# Patient Record
Sex: FEMALE | Race: BLACK OR AFRICAN AMERICAN | NOT HISPANIC OR LATINO | Employment: FULL TIME | URBAN - METROPOLITAN AREA
[De-identification: names, ages, dates, MRNs, and addresses within clinical notes are randomized per-mention and may not be internally consistent; named-entity substitution may affect disease eponyms.]

---

## 2018-11-27 ENCOUNTER — HOSPITAL ENCOUNTER (INPATIENT)
Facility: HOSPITAL | Age: 33
LOS: 1 days | Discharge: HOME | DRG: 054 | End: 2018-11-28
Attending: EMERGENCY MEDICINE | Admitting: NEUROLOGICAL SURGERY
Payer: MEDICAID

## 2018-11-27 DIAGNOSIS — D32.9 MENINGIOMA: ICD-10-CM

## 2018-11-27 DIAGNOSIS — G93.6 CEREBRAL EDEMA: ICD-10-CM

## 2018-11-27 DIAGNOSIS — H53.40 VISUAL FIELD CUT: ICD-10-CM

## 2018-11-27 DIAGNOSIS — H53.8 BLURRY VISION: ICD-10-CM

## 2018-11-27 DIAGNOSIS — E23.6 PITUITARY MASS: Primary | ICD-10-CM

## 2018-11-27 DIAGNOSIS — H54.61 DECREASED VISION OF RIGHT EYE: ICD-10-CM

## 2018-11-27 DIAGNOSIS — Z01.810 PREOP CARDIOVASCULAR EXAM: ICD-10-CM

## 2018-11-27 PROBLEM — H47.291: Status: ACTIVE | Noted: 2018-11-27

## 2018-11-27 LAB
ALBUMIN SERPL BCP-MCNC: 3.5 G/DL
ALP SERPL-CCNC: 85 U/L
ALT SERPL W/O P-5'-P-CCNC: 6 U/L
ANION GAP SERPL CALC-SCNC: 8 MMOL/L
APTT BLDCRRT: 21.9 SEC
AST SERPL-CCNC: 13 U/L
B-HCG UR QL: NEGATIVE
BASOPHILS # BLD AUTO: 0.04 K/UL
BASOPHILS NFR BLD: 0.5 %
BILIRUB SERPL-MCNC: 0.4 MG/DL
BUN SERPL-MCNC: 9 MG/DL
CALCIUM SERPL-MCNC: 9.1 MG/DL
CHLORIDE SERPL-SCNC: 110 MMOL/L
CO2 SERPL-SCNC: 20 MMOL/L
CORTIS SERPL-MCNC: 11.6 UG/DL
CREAT SERPL-MCNC: 0.7 MG/DL
CTP QC/QA: YES
DIFFERENTIAL METHOD: ABNORMAL
EOSINOPHIL # BLD AUTO: 0.1 K/UL
EOSINOPHIL NFR BLD: 1.2 %
ERYTHROCYTE [DISTWIDTH] IN BLOOD BY AUTOMATED COUNT: 15.4 %
EST. GFR  (AFRICAN AMERICAN): >60 ML/MIN/1.73 M^2
EST. GFR  (NON AFRICAN AMERICAN): >60 ML/MIN/1.73 M^2
ESTRADIOL SERPL-MCNC: 74 PG/ML
FSH SERPL-ACNC: 7.9 MIU/ML
GLUCOSE SERPL-MCNC: 99 MG/DL
HCT VFR BLD AUTO: 32 %
HGB BLD-MCNC: 9.9 G/DL
IMM GRANULOCYTES # BLD AUTO: 0.02 K/UL
IMM GRANULOCYTES NFR BLD AUTO: 0.3 %
INR PPP: 1
LH SERPL-ACNC: 2.5 MIU/ML
LYMPHOCYTES # BLD AUTO: 1.8 K/UL
LYMPHOCYTES NFR BLD: 23.8 %
MCH RBC QN AUTO: 24.9 PG
MCHC RBC AUTO-ENTMCNC: 30.9 G/DL
MCV RBC AUTO: 80 FL
MONOCYTES # BLD AUTO: 0.4 K/UL
MONOCYTES NFR BLD: 4.8 %
NEUTROPHILS # BLD AUTO: 5.2 K/UL
NEUTROPHILS NFR BLD: 69.4 %
NRBC BLD-RTO: 0 /100 WBC
PLATELET # BLD AUTO: 388 K/UL
PMV BLD AUTO: 11 FL
POTASSIUM SERPL-SCNC: 3.7 MMOL/L
PROLACTIN SERPL IA-MCNC: 68.9 NG/ML
PROT SERPL-MCNC: 6.9 G/DL
PROTHROMBIN TIME: 10.5 SEC
RBC # BLD AUTO: 3.98 M/UL
SODIUM SERPL-SCNC: 138 MMOL/L
T3 SERPL-MCNC: 110 NG/DL
T3FREE SERPL-MCNC: 3.7 PG/ML
T4 FREE SERPL-MCNC: 1.19 NG/DL
T4 SERPL-MCNC: 8.6 UG/DL
TESTOST SERPL-MCNC: 20 NG/DL
TSH SERPL DL<=0.005 MIU/L-ACNC: 3.13 UIU/ML
WBC # BLD AUTO: 7.51 K/UL

## 2018-11-27 PROCEDURE — 83001 ASSAY OF GONADOTROPIN (FSH): CPT

## 2018-11-27 PROCEDURE — 84480 ASSAY TRIIODOTHYRONINE (T3): CPT

## 2018-11-27 PROCEDURE — 93010 ELECTROCARDIOGRAM REPORT: CPT | Mod: ,,, | Performed by: INTERNAL MEDICINE

## 2018-11-27 PROCEDURE — 82533 TOTAL CORTISOL: CPT

## 2018-11-27 PROCEDURE — 12000002 HC ACUTE/MED SURGE SEMI-PRIVATE ROOM

## 2018-11-27 PROCEDURE — 85610 PROTHROMBIN TIME: CPT

## 2018-11-27 PROCEDURE — 83003 ASSAY GROWTH HORMONE (HGH): CPT

## 2018-11-27 PROCEDURE — 81025 URINE PREGNANCY TEST: CPT | Performed by: EMERGENCY MEDICINE

## 2018-11-27 PROCEDURE — 84403 ASSAY OF TOTAL TESTOSTERONE: CPT

## 2018-11-27 PROCEDURE — 83002 ASSAY OF GONADOTROPIN (LH): CPT

## 2018-11-27 PROCEDURE — 84443 ASSAY THYROID STIM HORMONE: CPT

## 2018-11-27 PROCEDURE — 84481 FREE ASSAY (FT-3): CPT

## 2018-11-27 PROCEDURE — 82670 ASSAY OF TOTAL ESTRADIOL: CPT

## 2018-11-27 PROCEDURE — 93005 ELECTROCARDIOGRAM TRACING: CPT

## 2018-11-27 PROCEDURE — 85025 COMPLETE CBC W/AUTO DIFF WBC: CPT

## 2018-11-27 PROCEDURE — 84439 ASSAY OF FREE THYROXINE: CPT

## 2018-11-27 PROCEDURE — 83519 RIA NONANTIBODY: CPT

## 2018-11-27 PROCEDURE — 99285 EMERGENCY DEPT VISIT HI MDM: CPT | Mod: 25

## 2018-11-27 PROCEDURE — 84305 ASSAY OF SOMATOMEDIN: CPT

## 2018-11-27 PROCEDURE — 80053 COMPREHEN METABOLIC PANEL: CPT

## 2018-11-27 PROCEDURE — 84146 ASSAY OF PROLACTIN: CPT

## 2018-11-27 PROCEDURE — 20600001 HC STEP DOWN PRIVATE ROOM

## 2018-11-27 PROCEDURE — 85730 THROMBOPLASTIN TIME PARTIAL: CPT

## 2018-11-27 PROCEDURE — 84436 ASSAY OF TOTAL THYROXINE: CPT

## 2018-11-27 PROCEDURE — 99284 EMERGENCY DEPT VISIT MOD MDM: CPT | Mod: ,,, | Performed by: EMERGENCY MEDICINE

## 2018-11-27 PROCEDURE — 82024 ASSAY OF ACTH: CPT

## 2018-11-27 RX ORDER — IBUPROFEN 200 MG
16 TABLET ORAL
Status: DISCONTINUED | OUTPATIENT
Start: 2018-11-27 | End: 2018-11-28

## 2018-11-27 RX ORDER — INSULIN ASPART 100 [IU]/ML
0-5 INJECTION, SOLUTION INTRAVENOUS; SUBCUTANEOUS
Status: DISCONTINUED | OUTPATIENT
Start: 2018-11-27 | End: 2018-11-28

## 2018-11-27 RX ORDER — IBUPROFEN 200 MG
24 TABLET ORAL
Status: DISCONTINUED | OUTPATIENT
Start: 2018-11-27 | End: 2018-11-28

## 2018-11-27 RX ORDER — GLUCAGON 1 MG
1 KIT INJECTION
Status: DISCONTINUED | OUTPATIENT
Start: 2018-11-27 | End: 2018-11-28

## 2018-11-27 RX ORDER — SODIUM CHLORIDE 9 MG/ML
INJECTION, SOLUTION INTRAVENOUS CONTINUOUS
Status: DISCONTINUED | OUTPATIENT
Start: 2018-11-28 | End: 2018-11-28

## 2018-11-27 RX ADMIN — SODIUM CHLORIDE: 0.9 INJECTION, SOLUTION INTRAVENOUS at 11:11

## 2018-11-28 ENCOUNTER — TELEPHONE (OUTPATIENT)
Dept: NEUROSURGERY | Facility: CLINIC | Age: 33
End: 2018-11-28

## 2018-11-28 VITALS
TEMPERATURE: 99 F | OXYGEN SATURATION: 98 % | BODY MASS INDEX: 41.75 KG/M2 | SYSTOLIC BLOOD PRESSURE: 142 MMHG | WEIGHT: 221.13 LBS | DIASTOLIC BLOOD PRESSURE: 81 MMHG | HEART RATE: 92 BPM | HEIGHT: 61 IN | RESPIRATION RATE: 18 BRPM

## 2018-11-28 DIAGNOSIS — D32.9 MENINGIOMA: Primary | ICD-10-CM

## 2018-11-28 LAB
ANION GAP SERPL CALC-SCNC: 7 MMOL/L
BASOPHILS # BLD AUTO: 0.05 K/UL
BASOPHILS NFR BLD: 0.6 %
BUN SERPL-MCNC: 9 MG/DL
CALCIUM SERPL-MCNC: 9 MG/DL
CHLORIDE SERPL-SCNC: 110 MMOL/L
CO2 SERPL-SCNC: 21 MMOL/L
CREAT SERPL-MCNC: 0.7 MG/DL
DIFFERENTIAL METHOD: ABNORMAL
EOSINOPHIL # BLD AUTO: 0.1 K/UL
EOSINOPHIL NFR BLD: 0.8 %
ERYTHROCYTE [DISTWIDTH] IN BLOOD BY AUTOMATED COUNT: 15.4 %
EST. GFR  (AFRICAN AMERICAN): >60 ML/MIN/1.73 M^2
EST. GFR  (NON AFRICAN AMERICAN): >60 ML/MIN/1.73 M^2
GLUCOSE SERPL-MCNC: 89 MG/DL
HCT VFR BLD AUTO: 31.5 %
HGB BLD-MCNC: 9.7 G/DL
IMM GRANULOCYTES # BLD AUTO: 0.02 K/UL
IMM GRANULOCYTES NFR BLD AUTO: 0.2 %
LYMPHOCYTES # BLD AUTO: 1.8 K/UL
LYMPHOCYTES NFR BLD: 21.7 %
MCH RBC QN AUTO: 25.2 PG
MCHC RBC AUTO-ENTMCNC: 30.8 G/DL
MCV RBC AUTO: 82 FL
MONOCYTES # BLD AUTO: 0.5 K/UL
MONOCYTES NFR BLD: 5.9 %
NEUTROPHILS # BLD AUTO: 6 K/UL
NEUTROPHILS NFR BLD: 70.8 %
NRBC BLD-RTO: 0 /100 WBC
PLATELET # BLD AUTO: 385 K/UL
PMV BLD AUTO: 10.9 FL
POTASSIUM SERPL-SCNC: 3.9 MMOL/L
RBC # BLD AUTO: 3.85 M/UL
SODIUM SERPL-SCNC: 138 MMOL/L
WBC # BLD AUTO: 8.46 K/UL

## 2018-11-28 PROCEDURE — 25500020 PHARM REV CODE 255: Performed by: NEUROLOGICAL SURGERY

## 2018-11-28 PROCEDURE — 85025 COMPLETE CBC W/AUTO DIFF WBC: CPT

## 2018-11-28 PROCEDURE — 80048 BASIC METABOLIC PNL TOTAL CA: CPT

## 2018-11-28 PROCEDURE — 63600175 PHARM REV CODE 636 W HCPCS: Performed by: PHYSICIAN ASSISTANT

## 2018-11-28 PROCEDURE — 36415 COLL VENOUS BLD VENIPUNCTURE: CPT

## 2018-11-28 PROCEDURE — A9585 GADOBUTROL INJECTION: HCPCS | Performed by: NEUROLOGICAL SURGERY

## 2018-11-28 PROCEDURE — 97161 PT EVAL LOW COMPLEX 20 MIN: CPT

## 2018-11-28 PROCEDURE — 25000003 PHARM REV CODE 250: Performed by: PHYSICIAN ASSISTANT

## 2018-11-28 PROCEDURE — 99238 HOSP IP/OBS DSCHRG MGMT 30/<: CPT | Mod: ,,, | Performed by: PHYSICIAN ASSISTANT

## 2018-11-28 PROCEDURE — 25000003 PHARM REV CODE 250: Performed by: STUDENT IN AN ORGANIZED HEALTH CARE EDUCATION/TRAINING PROGRAM

## 2018-11-28 PROCEDURE — 97165 OT EVAL LOW COMPLEX 30 MIN: CPT

## 2018-11-28 RX ORDER — ACETAMINOPHEN 325 MG/1
650 TABLET ORAL EVERY 6 HOURS PRN
Status: DISCONTINUED | OUTPATIENT
Start: 2018-11-28 | End: 2018-11-28 | Stop reason: HOSPADM

## 2018-11-28 RX ORDER — GADOBUTROL 604.72 MG/ML
10 INJECTION INTRAVENOUS
Status: COMPLETED | OUTPATIENT
Start: 2018-11-28 | End: 2018-11-28

## 2018-11-28 RX ORDER — FAMOTIDINE 20 MG/1
20 TABLET, FILM COATED ORAL 2 TIMES DAILY
Status: DISCONTINUED | OUTPATIENT
Start: 2018-11-28 | End: 2018-11-28 | Stop reason: HOSPADM

## 2018-11-28 RX ORDER — DEXAMETHASONE 2 MG/1
TABLET ORAL
Qty: 75 TABLET | Refills: 0 | Status: ON HOLD | OUTPATIENT
Start: 2018-11-28 | End: 2018-12-23 | Stop reason: HOSPADM

## 2018-11-28 RX ORDER — FAMOTIDINE 20 MG/1
20 TABLET, FILM COATED ORAL 2 TIMES DAILY
Qty: 60 TABLET | Refills: 1 | Status: SHIPPED | OUTPATIENT
Start: 2018-11-28 | End: 2019-07-09

## 2018-11-28 RX ORDER — DEXAMETHASONE 4 MG/1
4 TABLET ORAL EVERY 6 HOURS
Status: DISCONTINUED | OUTPATIENT
Start: 2018-11-28 | End: 2018-11-28 | Stop reason: HOSPADM

## 2018-11-28 RX ADMIN — FAMOTIDINE 20 MG: 20 TABLET ORAL at 09:11

## 2018-11-28 RX ADMIN — DEXAMETHASONE 4 MG: 4 TABLET ORAL at 05:11

## 2018-11-28 RX ADMIN — DEXAMETHASONE 4 MG: 4 TABLET ORAL at 11:11

## 2018-11-28 RX ADMIN — GADOBUTROL 10 ML: 604.72 INJECTION INTRAVENOUS at 02:11
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
## 2018-11-28 NOTE — PT/OT/SLP EVAL
Occupational Therapy   Evaluation and Discharge Note    Name: Radha Moses  MRN: [id number]  Admitting Diagnosis:  Meningioma      Recommendations:     Discharge Recommendations: home  Discharge Equipment Recommendations:  none  Barriers to discharge:  None    History:     Occupational Profile:  Living Environment: Pt lives with her 16 y/o daughter in 1st floor apartment with 0 DAVID.  Previous level of function: (I) with ADLs and mobility, working as Pre-K teacher  Roles and Routines: Mom, teacher  Equipment Owned:  none  Assistance upon Discharge: Family able to assist    Past Medical History:   Diagnosis Date    Asthma     Hypertension        History reviewed. No pertinent surgical history.    Subjective     Chief Complaint: None stated  Patient/Family stated goals: Return home  Communicated with: RN prior to session.  Pain/Comfort:  · Pain Rating 1: 0/10  · Pain Rating Post-Intervention 1: 0/10    Patients cultural, spiritual, Faith conflicts given the current situation: None    Objective:     Patient found with: telemetry, peripheral IV    General Precautions: Standard, vision impaired   Orthopedic Precautions:N/A   Braces: N/A     Occupational Performance:    Bed Mobility:    · Patient completed Supine to Sit with modified independence with HOB elevated    Functional Mobility/Transfers:  · Patient completed Sit <> Stand Transfer with independence with no assistive device from EOB  · Functional Mobility: supervision in hallway household distance with no AD; reports no visual issues    Activities of Daily Living:  · Upper Body Dressing: independence    · Lower Body Dressing: independence    · Toileting: independence      Cognitive/Visual Perceptual:  WFL  Reports blurry vision on arrival to the hospital but mostly resolved with eyedrops this morning    Physical Exam:  Pt demo good sitting and standing balance  B UE ROM and MMT WFL with intact coordination and sensation    Patient left up in chair with all  "lines intact and call button in reach    [facility] 6 Click:  [facility] Total Score: 24    Treatment & Education:  OT/PT eval; educated on OT role and POC including no further acute OT needs  Education:    Assessment:     Radha Moses is a 33 y.o. female with a medical diagnosis of Meningioma. At this time, patient is functioning at their prior level of function and does not require further acute OT services.     Clinical Decision Makin.  OT Low:  "Pt evaluation falls under low complexity for evaluation coding due to performance deficits noted in 1-3 areas as stated above and 0 co-morbities affecting current functional status. Data obtained from problem focused assessments. No modifications or assistance was required for completion of evaluation. Only brief occupational profile and history review completed."     Plan:     During this hospitalization, patient does not require further acute OT services.  Please re-consult if situation changes.    · Plan of Care Reviewed with: patient    This Plan of care has been discussed with the patient who was involved in its development and understands and is in agreement with the identified goals and treatment plan    GOALS:   Multidisciplinary Problems     Occupational Therapy Goals     Not on file          Multidisciplinary Problems (Resolved)        Problem: Occupational Therapy Goal    Goal Priority Disciplines Outcome Interventions   Occupational Therapy Goal   (Resolved)     OT, PT/OT Outcome(s) achieved                    Time Tracking:     OT Date of Treatment: 18  OT Start Time: 908  OT Stop Time: 916  OT Total Time (min): 8 min    Billable Minutes:Evaluation 8 minutes    OLLIE Aguilar  2018    "

## 2018-11-28 NOTE — HOSPITAL COURSE
11/27: Admit to NSGY service for Ophthalmology exam and surgical consultation  11/28: Exam stable. Optho exam neg for papilledema. Formal visual fields testing and OCT pending. DC once complete and RTC to discuss surgical intervention. Plan discussed by Dr. Linda with the patient. All of her questions were answered. She was encouraged to call the clinic with any questions or concerns prior to her follow up.
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
## 2018-11-28 NOTE — PLAN OF CARE
CM met with patient to obtain discharge planning assessment. Patient admitted with meningioma evaluation.  Planned discharge is home - Plan (A) and (B).    PCP:  LYNN Guerrero MD     Payor:  Payor: MEDICAID / Plan: [place] [employer] / Product Type: Managed Medicaid /      Pharmacy:     11/28/18 1422   Discharge Assessment   Assessment Type Discharge Planning Assessment   Confirmed/corrected address and phone number on facesheet? Yes   Assessment information obtained from? Patient   Expected Length of Stay (days) 1   Communicated expected length of stay with patient/caregiver yes   Prior to hospitilization cognitive status: Alert/Oriented   Prior to hospitalization functional status: Independent   Current cognitive status: Alert/Oriented   Current Functional Status: Independent   Lives With child(gloria), adult   Able to Return to Prior Arrangements yes   Is patient able to care for self after discharge? Yes   Who are your caregiver(s) and their phone number(s)? Mely Garcia - mother [phone]   Patient's perception of discharge disposition home or selfcare   Readmission Within The Last 30 Days no previous admission in last 30 days   Patient currently being followed by outpatient case management? No   Patient currently receives any other outside agency services? No   Equipment Currently Used at Home none   Do you have any problems affording any of your prescribed medications? No   Is the patient taking medications as prescribed? yes   Does the patient have transportation home? No   Does the patient receive services at the Coumadin Clinic? No   Discharge Plan A Home   Discharge Plan B Home   Patient/Family In Agreement With Plan yes

## 2018-11-28 NOTE — PLAN OF CARE
Problem: Patient Care Overview  Goal: Plan of Care Review  Outcome: Ongoing (interventions implemented as appropriate)  Plan of Care reviewed/acknowledged by pt. Uneventful shift. Pt still with blurred vision to rt eye. Pt was seen by Ophthalmologist at the clinic today. Plan is for pt to come back next week for surgery. Pt was given d/c instructions with scripts. Condition stable/good.  had set up pick-up time for 1700 with Medicaid transportation van.

## 2018-11-28 NOTE — DISCHARGE INSTRUCTIONS
Patient Information  -You have been started on a steroid (Decadron) taper. Please continue to take this as instructed. You were also started on Famotidine to protect your stomach while taking the steroids. Please take this medication twice daily.   -Do not take any Aspirin or Aspirin containing products in preparation for surgery.   -Do not take any Aleeve, Naprosyn, Naproxen, Ibuprofen, Advil or any other NSAID in preparation for surgery.   -Do not take any Fish oil or Flax seed oil in preparation for surgery.   -Do not consume any alcoholic beverages 48 hours prior to your surgery.    Contact the Neurosurgery Office immediately if:  -If you begin to notice any neurologic changes such as:           -Sudden onset of lethargy or sleepiness           -Sudden confusion, trouble speaking, or understanding            -Sudden trouble seeing in one or both eyes            -Sudden trouble walking, dizziness, loss of coordination            -Sudden severe headache with no known cause            -Sudden onset of numbness or weakness         Neurosurgery Office: [phone]

## 2018-11-28 NOTE — ED NOTES
Patient placed on cardiac monitor per order set.   Telemetry Room called and notified patient is on cardiac monitor.  Box #  [id number]  Rhythm: NSR 88

## 2018-11-28 NOTE — NURSING
Pt back in her room post eye exam, no complaints. Pt is aware she had d/c orders for today. Pt will need transportation home. SW is aware.

## 2018-11-28 NOTE — SUBJECTIVE & OBJECTIVE
Current Facility-Administered Medications   Medication    [START ON 11/28/2018] 0.9%  NaCl infusion    dextrose 50% injection 12.5 g    dextrose 50% injection 25 g    glucagon (human recombinant) injection 1 mg    glucose chewable tablet 16 g    glucose chewable tablet 16 g    glucose chewable tablet 24 g    insulin aspart U-100 pen 0-5 Units     No current outpatient medications on file.       Review of Systems  Objective:     Vital Signs (Most Recent):  Temp: 98.4 °F (36.9 °C) (11/27/18 2112)  Pulse: 98 (11/27/18 2302)  Resp: (!) 22 (11/27/18 2302)  BP: (!) 144/67 (11/27/18 2302)  SpO2: 100 % (11/27/18 2302) Vital Signs (24h Range):  Temp:  [98.3 °F (36.8 °C)-98.4 °F (36.9 °C)] 98.4 °F (36.9 °C)  Pulse:  [68-98] 98  Resp:  [16-22] 22  SpO2:  [97 %-100 %] 100 %  BP: (126-144)/(67-88) 144/67     Weight: 95.3 kg (210 lb)  Body mass index is 39.68 kg/m².    Significant Labs:  All pertinent labs from the last 24 hours have been reviewed.    Significant Imaging:  MRI: No results found in the last 24 hours.
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
## 2018-11-28 NOTE — PLAN OF CARE
Problem: Fall Risk (Adult)  Goal: Identify Related Risk Factors and Signs and Symptoms  Related risk factors and signs and symptoms are identified upon initiation of Human Response Clinical Practice Guideline (CPG)   11/28/18 0035   Fall Risk   Related Risk Factors (Fall Risk) impaired vision;inadequate lighting;objects hard to reach;environment unfamiliar   Signs and Symptoms (Fall Risk) presence of risk factors
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
## 2018-11-28 NOTE — PLAN OF CARE
Problem: Physical Therapy Goal  Goal: Physical Therapy Goal  Outcome: Outcome(s) achieved Date Met: 11/28/18  Pt evaluated and discharged 2/2 having no physical therapy needs at this time.    Vishal Lovelace, LALITA  11/28/2018
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
## 2018-11-28 NOTE — PLAN OF CARE
Problem: Occupational Therapy Goal  Goal: Occupational Therapy Goal  Outcome: Outcome(s) achieved Date Met: 11/28/18  Leola and D/C OT 11/28/18

## 2018-11-28 NOTE — PLAN OF CARE
ANA following for DC needs. SW in communication with CM.    Patient needs transportation home. SW arranged transportation for the patient via Medicaid Transportation.    time is between 5:00PM and 8:00PM.   Reservation #: [id number].    SW notified nurse of the above.     Lexi Xiong, AIDEE  [facility]  [id number]
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
## 2018-11-28 NOTE — PT/OT/SLP EVAL
Physical Therapy Evaluation and Discharge Note    Patient Name:  Radha Moses   MRN:  [id number]    Recommendations:     Discharge Recommendations:  home   Discharge Equipment Recommendations: none   Barriers to discharge: None    Assessment:     Radha Moses is a 33 y.o. female admitted with a medical diagnosis of Meningioma. Pt performed all bed mobility independently. Pt ambulated ~125ft in the hallway without AD and with supervision from PT. Pt has no deficits at this time other than occasional blurry vision. At this time, patient is functioning at their prior level of function and does not require further acute PT services.      Recent Surgery: * No surgery found *      Plan:     During this hospitalization, patient does not require further acute PT services.  Please re-consult if situation changes.      Subjective     Chief Complaint: none  Patient/Family Comments/goals: Pt stated that her doctor gave her eye drops this morning and that she has not had any blurry vision since.  Pain/Comfort:  · Pain Rating 1: 0/10  · Pain Rating Post-Intervention 1: 0/10    Patients cultural, spiritual, Zoroastrian conflicts given the current situation: no conflicts    Living Environment:  Pt lives with her 17-year-old daughter in a 1st-floor apartment with no DAVID.  Prior to admission, patients level of function was independent with all ADLs and functional mobility. Pt currently works as a . Equipment used at home: none. Upon discharge, patient will have occasional assistance from daughter although patient does not require any assistance at this time.     Objective:     Communicated with RN prior to session.  Patient found laying supine upon PT entry to room found with: telemetry, peripheral IV     General Precautions: Standard, vision impaired   Orthopedic Precautions:N/A   Braces: N/A     Exams:  · Cognitive Exam:  Patient is oriented to Person, Place, Time and Situation  · Gross Motor Coordination:  WFL  via observation of functional activity  · Postural Exam:  Patient presented with the following abnormalities:    · -       No postural abnormalities identified  · Sensation:    · -       Intact  · Skin Integrity/Edema:      · -       Skin integrity: Visible skin intact  · -       Edema: None noted    · RLE ROM: WFL  · RLE Strength: WFL  · LLE ROM: WFL  · LLE Strength: WFL    Functional Mobility:  Bed mobility:  Supine>sit: x1 trial(s); independent    Transfers:  Sit>stand: x1 trial(s) from bed; supervision without AD  Stand>sit: x1 trial(s) to bedside chair; supervision without AD    Gait:  · Distance: x1 trial(s); ~125ft with supervision from PT  · AD: none  · Gait deviations: none    Balance:  · Static sitting: independent  · Dyamic sitting: independent  · Static standing: supervision  · Dynamic standing: supervision    AM-PAC 6 CLICK MOBILITY  Total Score:24       Therapeutic Activities and Exercises:   Pt educated on why she was being evaluated by PT/OT. Pt educated that she has no PT needs at this time. Pt demonstrated understanding with all education. All needs and questions addressed.    AM-PAC 6 CLICK MOBILITY  Total Score:24     Patient left up in chair with all lines intact and call button in reach.    GOALS:   Multidisciplinary Problems     Physical Therapy Goals     Not on file          Multidisciplinary Problems (Resolved)        Problem: Physical Therapy Goal    Goal Priority Disciplines Outcome Goal Variances Interventions   Physical Therapy Goal   (Resolved)     PT, PT/OT Outcome(s) achieved                     History:     Past Medical History:   Diagnosis Date    Asthma     Hypertension        History reviewed. No pertinent surgical history.    Clinical Decision Making:     History  Co-morbidities and personal factors that may impact the plan of care Examination  Body Structures and Functions, activity limitations and participation restrictions that may impact the plan of care Clinical  Presentation   Decision Making/ Complexity Score   Co-morbidities:   [] Time since onset of injury / illness / exacerbation  [] Status of current condition  []Patient's cognitive status and safety concerns    [] Multiple Medical Problems (see med hx)  Personal Factors:   [] Patient's age  [] Prior Level of function   [] Patient's home situation (environment and family support)  [] Patient's level of motivation  [] Expected progression of patient      HISTORY:(criteria)    [x] 72670 - no personal factors/history    [] 95578 - has 1-2 personal factor/comorbidity     [] 32431 - has >3 personal factor/comorbidity     Body Regions:  [] Objective examination findings  [] Head     []  Neck  [] Trunk   [] Upper Extremity  [] Lower Extremity    Body Systems:  [] For communication ability, affect, cognition, language, and learning style: the assessment of the ability to make needs known, consciousness, orientation (person, place, and time), expected emotional /behavioral responses, and learning preferences (eg, learning barriers, education  needs)  [] For the neuromuscular system: a general assessment of gross coordinated movement (eg, balance, gait, locomotion, transfers, and transitions) and motor function  (motor control and motor learning)  [] For the musculoskeletal system: the assessment of gross symmetry, gross range of motion, gross strength, height, and weight  [] For the integumentary system: the assessment of pliability(texture), presence of scar formation, skin color, and skin integrity  [] For cardiovascular/pulmonary system: the assessment of heart rate, respiratory rate, blood pressure, and edema     Activity limitations:    [] Patient's cognitive status and saf ety concerns          [] Status of current condition      [] Weight bearing restriction  [] Cardiopulmunary Restriction    Participation Restrictions:   [] Goals and goal agreement with the patient     [] Rehab potential (prognosis) and probable outcome       Examination of Body System: (criteria)    [x] 93988 - addressing 1-2 elements    [] 08547 - addressing a total of 3 or more elements     [] 10371 -  Addressing a total of 4 or more elements         Clinical Presentation: (criteria)  Stable - 93843     On examination of body system using standardized tests and measures patient presents with 1-2 elements from any of the following: body structures and functions, activity limitations, and/or participation restrictions.  Leading to a clinical presentation that is considered stable and/or uncomplicated                              Clinical Decision Making  (Eval Complexity):  Low- 83532     Time Tracking:     PT Received On: 11/28/18  PT Start Time: 0909     PT Stop Time: 0916  PT Total Time (min): 7 min     Billable Minutes: Evaluation 7      Vishal Lovelace, [facility]  11/28/2018

## 2018-11-28 NOTE — PLAN OF CARE
Patient to be discharged home.  The patient does not have any home needs.  Patient will be provided transportation with Medicaid transportation.  Neurosurgery clinic to schedule follow up appointment.       11/28/18 1438   Final Note   Assessment Type Final Discharge Note   Anticipated Discharge Disposition Home   Hospital Follow Up  Appt(s) scheduled? (Neurosrugery clinic to schedule follow up appointment.)   Discharge plans and expectations educations in teach back method with documentation complete? Yes

## 2018-11-28 NOTE — PLAN OF CARE
SW following for DC needs. SW in communication with CM.    No needs identified at this time.     Lexi Xiong LMSW  [facility]  [id number]

## 2018-11-28 NOTE — SUBJECTIVE & OBJECTIVE
Interval History:   NAEON. Patient resting comfortably in bed. Denies headaches, dizziness, N/V, seizure like activity, weakness, or any new symptoms. Vision loss continues, R>L, but has not progressed. NPO since admission. Voiding appropriately.     Medications:  Continuous Infusions:   sodium chloride 0.9% 100 mL/hr at 11/27/18 2321     Scheduled Meds:   dexamethasone  4 mg Oral Q6H    famotidine  20 mg Oral BID     PRN Meds:acetaminophen     Review of Systems  Objective:     Weight: 100.3 kg (221 lb 1.9 oz)  Body mass index is 41.78 kg/m².  Vital Signs (Most Recent):  Temp: 98 °F (36.7 °C) (11/28/18 0720)  Pulse: 67 (11/28/18 1111)  Resp: 20 (11/28/18 0720)  BP: 120/64 (11/28/18 0720)  SpO2: 99 % (11/28/18 0720) Vital Signs (24h Range):  Temp:  [97 °F (36.1 °C)-98.4 °F (36.9 °C)] 98 °F (36.7 °C)  Pulse:  [67-98] 67  Resp:  [16-22] 20  SpO2:  [97 %-100 %] 99 %  BP: (120-144)/(63-88) 120/64        Neurosurgery Physical Exam   General: well developed, obese, no distress.   Head: normocephalic, atraumatic  Neurologic: Alert and oriented. Thought content appropriate.  GCS: Motor: 6/Verbal: 5/Eyes: 4 GCS Total: 15  Mental Status: Awake, Alert, Oriented x 4  Language: No aphasia  Speech: No dysarthria  Cranial nerves: face symmetric, tongue midline, CN II-XII grossly intact.   Eyes: pupils equal, round, reactive to light with accomodation, EOMI.   Pulmonary: normal respirations, no signs of respiratory distress  Abdomen: soft, non-distended, not tender to palpation  Sensory: intact to light touch throughout  Motor Strength:Moves all extremities spontaneously with good tone.  Full strength upper and lower extremities. No abnormal movements seen.   Pronator Drift: no drift noted  Finger-to-nose: Intact bilaterally  Clonus: absent  Babinski: absent  Skin: Skin is warm, dry and intact.        Significant Labs:  Recent Labs   Lab 11/27/18 2128 11/28/18  0528   GLU 99 89    138   K 3.7 3.9    110   CO2 20* 21*    BUN 9 9   CREATININE 0.7 0.7   CALCIUM 9.1 9.0     Recent Labs   Lab 11/27/18 2128 11/28/18  0528   WBC 7.51 8.46   HGB 9.9* 9.7*   HCT 32.0* 31.5*   * 385*     Recent Labs   Lab 11/27/18 2128   INR 1.0   APTT 21.9         Significant Diagnostics:  MRI brain:  I independently reviewed the imaging.     Homogeneous enhancing extra-axial lesion floor of the anterior cranial fossa along the planum sphenoidale with enhancing dural tail most compatible with meningioma    Please note there is sellar and suprasellar extension with mass effect and compression of the pituitary gland with infundibulum and intracranial optic nerves not seen separate from the process likely displaced posteriorly with pituitary adenoma felt to be much less likely.    No evidence for hydrocephalus or parenchymal signal abnormality.    Please note there is partial encasement of the proximal supraclinoid ICAs right greater than left.

## 2018-11-29 ENCOUNTER — ANESTHESIA EVENT (OUTPATIENT)
Dept: SURGERY | Facility: HOSPITAL | Age: 33
DRG: 025 | End: 2018-11-29
Payer: MEDICAID

## 2018-11-29 DIAGNOSIS — Z01.818 PREOPERATIVE TESTING: Primary | ICD-10-CM

## 2018-11-29 NOTE — PRE ADMISSION SCREENING
Anesthesia Assessment: Preoperative EQUATION    Planned Procedure: Procedure(s) (LRB):  CRANIOTOMY, USING FRAMELESS STEREOTAXY Right fronto-temporal (Right)  Requested Anesthesia Type:General  Surgeon: Oscar Linda MD  Service: Neurosurgery  Known or anticipated Date of Surgery:12/6/2018    Surgeon notes: reviewed    Electronic QUestionnaire Assessment completed via nurse interview with patient.  NO AQ        Triage considerations:     The patient has no apparent active cardiac condition (No unstable coronary Syndrome such as severe unstable angina or recent [<1 month] myocardial infarction, decompensated CHF, severe valvular   disease or significant arrhythmia)    Previous anesthesia records:No problems and Not availablePATIENT stated had a c section and wisdom teeth removal.    Last PCP note: outside [facility]   Subspecialty notes: Opthalmology    Other important co-morbidities: htn, asthma, meningioma     Tests already available:  Available tests,  within 1 month , within [facility] .11/28/2018 bm, cbc, mri brain stealth with fiducils, mri brain w w/o contrast, 11/27/2018TSH,PTT, PT/INR, CMP,&  EKG            Instructions given. (See in Nurse's note)    Optimization:     Medical Opinion Indicated      Plan:    Testing:  T&S        Consultation:IM Perioperative Hospitalist     Patient  has previously scheduled Medical Appointment:NONE    Navigation: Tests Scheduled. TBD             Consults scheduled.12/4             Results will be tracked by Preop Clinic.

## 2018-11-29 NOTE — NURSING
Pt still waiting on transportation; pick-up is between 5 to 8pm. No complaints from patient. Condition stable.
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
## 2018-11-29 NOTE — PRE-PROCEDURE INSTRUCTIONS
Patient stated has not had problems with anesthesia in the past. Had a  dn wisdom teeth extraction. Has appt with Dr. Castro for  at 4pm.She will be staying in town from then for her surgery.  Needs t& s. Our  will call to set up this appt.Preop instructions given. Hold asa, asa containing products, nsaids, vitamins and supplements one week prior to surgery Shower the night before surgery and the morning of surgery with an antibacterial soap( hibiclens or dial antibacterial soap).  Nothing on the skin once shower. Do not apply any deodorant, lotion, powder, perfume,or aftershave. No makeup or moisturizer.  No jewelry going to surgery.  May have solid foods, gum, and hard candy until 8 hours before surgery/procedure time.  May have clear liquids( water, gatorade, powerade or apple juice) until 2 hours prior to surgery/procedure time.  No red or orange drinks. If in doubt , drink water. Nothing to drink 2 hours before arrival time for surgery/procedure. If you are told to take medication in the morning of surgery, it may be taken with a sip of water. If your doctor tells you something different pertaining to when to stop eating or drinking, follow your doctor's instructions. Call for changes in status or questions.  Verbalizes understanding.

## 2018-11-30 ENCOUNTER — TELEPHONE (OUTPATIENT)
Dept: PREADMISSION TESTING | Facility: HOSPITAL | Age: 33
End: 2018-11-30

## 2018-11-30 ENCOUNTER — TELEPHONE (OUTPATIENT)
Dept: NEUROSURGERY | Facility: CLINIC | Age: 33
End: 2018-11-30

## 2018-11-30 LAB
ACTH PLAS-MCNC: 17 PG/ML
GH SERPL-MCNC: 1.2 NG/ML
IGF-I SERPL-MCNC: 143 NG/ML (ref 59–279)
IGF-I Z-SCORE SERPL: 0 SD

## 2018-11-30 NOTE — TELEPHONE ENCOUNTER
----- Message from Kaitlyn Capellan RN sent at 11/29/2018  4:21 PM CST -----  Please schedule t& s for Dec 5th, patient will be in town. Please call her to give her a time. Thanks!

## 2018-12-04 ENCOUNTER — INITIAL CONSULT (OUTPATIENT)
Dept: INTERNAL MEDICINE | Facility: CLINIC | Age: 33
End: 2018-12-04
Payer: MEDICAID

## 2018-12-04 ENCOUNTER — LAB VISIT (OUTPATIENT)
Dept: LAB | Facility: HOSPITAL | Age: 33
End: 2018-12-04
Attending: ANESTHESIOLOGY
Payer: MEDICAID

## 2018-12-04 VITALS
DIASTOLIC BLOOD PRESSURE: 83 MMHG | BODY MASS INDEX: 38.22 KG/M2 | SYSTOLIC BLOOD PRESSURE: 117 MMHG | HEIGHT: 63 IN | HEART RATE: 69 BPM | WEIGHT: 215.69 LBS | OXYGEN SATURATION: 99 % | TEMPERATURE: 98 F

## 2018-12-04 DIAGNOSIS — Z01.818 PREOP EXAMINATION: Primary | ICD-10-CM

## 2018-12-04 DIAGNOSIS — D64.9 ANEMIA, UNSPECIFIED TYPE: ICD-10-CM

## 2018-12-04 DIAGNOSIS — Z78.9 USES BIRTH CONTROL: ICD-10-CM

## 2018-12-04 DIAGNOSIS — R06.83 SNORING: ICD-10-CM

## 2018-12-04 DIAGNOSIS — Z79.52 CURRENT USE OF STEROID MEDICATION: ICD-10-CM

## 2018-12-04 DIAGNOSIS — I10 ESSENTIAL HYPERTENSION: ICD-10-CM

## 2018-12-04 DIAGNOSIS — H54.61 DECREASED VISION OF RIGHT EYE: ICD-10-CM

## 2018-12-04 DIAGNOSIS — L81.8 TATTOOS: ICD-10-CM

## 2018-12-04 DIAGNOSIS — J45.20 MILD INTERMITTENT ASTHMA WITHOUT COMPLICATION: ICD-10-CM

## 2018-12-04 DIAGNOSIS — E66.9 CLASS 2 OBESITY WITH BODY MASS INDEX (BMI) OF 38.0 TO 38.9 IN ADULT, UNSPECIFIED OBESITY TYPE, UNSPECIFIED WHETHER SERIOUS COMORBIDITY PRESENT: ICD-10-CM

## 2018-12-04 DIAGNOSIS — Z01.818 PREOPERATIVE TESTING: ICD-10-CM

## 2018-12-04 LAB
ABO + RH BLD: NORMAL
BLD GP AB SCN CELLS X3 SERPL QL: NORMAL
BLOOD GROUP ANTIBODIES SERPL: NORMAL

## 2018-12-04 PROCEDURE — 99999 PR PBB SHADOW E&M-EST. PATIENT-LVL IV: CPT | Mod: PBBFAC,,, | Performed by: HOSPITALIST

## 2018-12-04 PROCEDURE — 86901 BLOOD TYPING SEROLOGIC RH(D): CPT

## 2018-12-04 PROCEDURE — 99214 OFFICE O/P EST MOD 30 MIN: CPT | Mod: PBBFAC,25 | Performed by: HOSPITALIST

## 2018-12-04 PROCEDURE — 36415 COLL VENOUS BLD VENIPUNCTURE: CPT

## 2018-12-04 PROCEDURE — 86870 RBC ANTIBODY IDENTIFICATION: CPT

## 2018-12-04 PROCEDURE — 86905 BLOOD TYPING RBC ANTIGENS: CPT

## 2018-12-04 PROCEDURE — 99204 OFFICE O/P NEW MOD 45 MIN: CPT | Mod: S$PBB,,, | Performed by: HOSPITALIST

## 2018-12-04 RX ORDER — ALBUTEROL SULFATE 90 UG/1
1-2 AEROSOL, METERED RESPIRATORY (INHALATION) EVERY 6 HOURS PRN
COMMUNITY

## 2018-12-04 NOTE — ASSESSMENT & PLAN NOTE
On Depot Medroxyprogesterone  injection every 3 months   Currently not sexually active   Due , for Depot Injection  on Dec 13 th   Risk of thrombosis discussed   Suggested to discuss with Ob/GYN   She  gets vaginal bleeding , if misses Depo even for a couple of days   After risk , benefit evaluation , will let her have Depo injection
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
## 2018-12-04 NOTE — OUTPATIENT SUBJECTIVE & OBJECTIVE
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       Review of Systems   Constitutional: Negative for chills and fever.        No unusual weight changes     HENT:        STOPBANG score  5/ 8    Loud Snoring    stopping of breathing   HTN  BMI> 35    Neck size over 40 CM     Eyes:        As noted    Respiratory:        No cough , phlegm  No acid reflux   No Hemoptysis   Cardiovascular:        As noted   Gastrointestinal:        No overt GI/ blood losses  Bowel movements- Regular   LMP- Nov 2017    Not sexually active in a long time - None in 2018    Endocrine:        Current use    Genitourinary: Negative for dysuria.        No urinary hesitancy    Musculoskeletal:          No unusual, muscle, joint pains   Skin: Negative for rash.   Neurological: Negative for syncope.        No unilateral weakness   Hematological:        Current use of Anticoagulants  Current use of Antiplatelet agents  None    Psychiatric/Behavioral:        No Depression,Anxiety       No vascular stenting   No past medical history pertinent negatives.  No family history on file.  No past surgical history on file.   Lives with 17 year old daughter   Family live close by     No anesthesia, bleeding, cardiac problems , PONV with previous surgeries/procedures.  Medications and Allergies reviewed in epic.   FH- No anesthesia,bleeding / venous thrombosis , early onset heart disease in family     Physical  "Exam  Blood pressure 117/83, pulse 69, temperature 97.7 °F (36.5 °C), temperature source Oral, height 5' 3" (1.6 m), weight 97.8 kg (215 lb 11.2 oz), last menstrual period 11/19/2018, SpO2 99 %.    Physical Exam  Constitutional- Vitals - Body mass index is 38.21 kg/m².,   Vitals:    12/04/18 1556   BP: 117/83   Pulse: 69   Temp: 97.7 °F (36.5 °C)     General appearance-Conscious,Coherent  Eyes- No conjunctival icterus,pupils  round  and reactive to light   ENT-Oral cavity- moist  , Hearing grossly normal   Neck- No thyromegaly ,Trachea -central, No jugular venous distension,   No Carotid Bruit   Cardiovascular -Heart Sounds- Normal  and  no murmur   , No gallop rhythm   Respiratory - Normal Respiratory Effort, Normal breath sounds,  no wheeze  and  no forced expiratory wheeze    Peripheral pitting pedal edema-- none , no calf pain   Gastrointestinal -Soft abdomen, No palpable masses, Non Tender,Liver,Spleen not palpable. No-- free fluid and shifting dullness  Musculoskeletal- No finger Clubbing. Strength grossly normal   Lymphatic-No Palpable cervical, axillary,Inguinal lymphadenopathy   Psychiatric - normal effect,Orientation  Rt Dorsalis pedis pulses-palpable    Lt Dorsalis pedis pulses- palpable   Rt Posterior tibial pulses -palpable   Left posterior tibial pulses -palpable   Miscellaneous -  no asterixis,  no dupuytren's contracture,  no renal bruit and  tattoos    Investigations  Lab and Imaging have been reviewed in epic.    11/27/2018 - ACTH,TSH-N    Review of Medicine tests    EKG- I had independently reviewed the EKG from--11/27/2018   It was reported to be showing     Normal sinus rhythm  Normal ECG  No previous ECGs available    MRI Nov 28 th 2018     Homogeneous enhancing extra-axial lesion floor of the anterior cranial fossa along the planum sphenoidale with enhancing dural tail most compatible with meningioma    Please note there is sellar and suprasellar extension with mass effect and compression of " the pituitary gland with infundibulum and intracranial optic nerves not seen separate from the process likely displaced posteriorly with pituitary adenoma felt to be much less likely.    No evidence for hydrocephalus or parenchymal signal abnormality.    Review of clinical lab tests:  Lab Results   Component Value Date    CREATININE 0.7 11/28/2018    HGB 9.7 (L) 11/28/2018     (H) 11/28/2018             Review of old records- Was done and information gathered regards to events leading to surgery and health conditions of significance in the perioperative period.    Outpatient Subjective & Objective

## 2018-12-04 NOTE — ASSESSMENT & PLAN NOTE
Reduced physical activity   Not known to  have sleep apnea , Diabetes   Daughter reports snoring , when she is very tired  , but no apnea  She some times stops breathing in sleep   Encouraged weight loss, sleep study

## 2018-12-04 NOTE — ASSESSMENT & PLAN NOTE
Known to have anemia for a long time   Has occasional heavy periods, if misses Medroxyprogesterone   No Overt GI/  blood losses   No colon cancer in family   Suggested follow up   Soluble transferrin receptor ordered  -  12/19 - 8 21     Did not have the Soluble transferrin receptor test   Suggested follow up

## 2018-12-04 NOTE — ASSESSMENT & PLAN NOTE
No recent problem   asthma is controlled . I suggest consideration of inhaled bronchodilator use if the patient has perioperative bronchospasm

## 2018-12-04 NOTE — ASSESSMENT & PLAN NOTE
Possible sleep apnea- I suggest a sleep study and suggest caution with usage of medication that can cause respiratory suppression in the perioperative period  potential ramifications of untreated sleep apnea, which could include daytime sleepiness, hypertension, heart disease and stroke were discussed    Avoidance of  supine sleep, weight gain , alcoholic beverages , care with , sedative , CNS depressant use indicated  since all of these can worsen NICOLA

## 2018-12-04 NOTE — ASSESSMENT & PLAN NOTE
Dexamethasone started 11/29   Steroid treatment- I suggest monitoring the patient for any suggestions of adrenal insufficiency in view of the recent steroid use  On a steroid taper   BMP- Glucose - Normal   .A1c -Normal
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
## 2018-12-04 NOTE — LETTER
December 4, 2018      Oscar Linda MD  [address]  [place] [address]           [facility]  [address] [facility] [address]  Phone: [phone]          Patient: Radha Moses   MR Number: [id number]   YOB: 1985   Date of Visit: 12/4/2018       Dear Dr. Oscar Linda:    Thank you for referring Radha Moses to me for evaluation. Attached you will find relevant portions of my assessment and plan of care.    If you have questions, please do not hesitate to call me. I look forward to following Radha Moses along with you.    Sincerely,    Myrna Castro MD    Enclosure  CC:  NORMA Guerrero MD    If you would like to receive this communication electronically, please contact [email or web address] or [phone] to request more information on [employer] access.    For providers and/or their staff who would like to refer a patient to [facility], please contact us through our one-stop-shop provider referral line, [facility], at [phone].    If you feel you have received this communication in error or would no longer like to receive these types of communications, please e-mail [email or web address]

## 2018-12-04 NOTE — PATIENT INSTRUCTIONS
Your surgery has been scheduled for:__Thurs 12/20__     You should report to:  _____[facility], located on the [place] side of the first floor of the [facility] ([phone])  ___X___[facility], located on the [facility] side of the Second floor of the [facility] ([phone])  ______3rd Floor [facility] located on the [facility] side of the [facility] [phone]    Please Note  -Tell your doctors if you take asprin, products containing aspirin, herbal medications or blood thinners, such as Coumadin, Ticlid, or Plavix. (Consult your provider regarding holding or stopping before surgery).  -Arrange for someone to drive you home following surgery. You will not be allowed to leave the surgical facility alone or drive yourself home following sedation and anesthesia.      Outpatient Encounter Medications as of 12/4/2018   Medication Sig Note Dispense Refill    dexamethasone (DECADRON) 2 MG tablet Take 4mg q 6h for 3 days, then 4mg q 8h for 3 days, then 4mg q 12h for 3 days, then 2mg q 8h for 3 days, then 2mg q 12h for 3 days, then 2mg daily for 6 days, then OFF. 11/29/2018: TAKE  AM OF SURGERY.   75 tablet 0    famotidine (PEPCID) 20 MG tablet Take 1 tablet (20 mg total) by mouth 2 (two) times daily. 11/29/2018: TAKE  AM OF SURGERY.   60 tablet 1     No facility-administered encounter medications on file as of 12/4/2018.          Please review the instructions regarding your above listed medications.    Before Surgery  -Stop taking all herbal medications 14 days prior to surgery  -Stop taking aspirin, products containing aspirin 7 days before surgery  -Stop taking blood thinners   days before surgery  -Refrain from drinking alcoholic beverages for 24 hours before and after surgery  -Stop or limit smoking   days before surgery    Night before Surgery (or as per your Surgeon)  -Stop ALL solid food, gum, candy 8 hours before  surgery/procedure time.  -Stop all CLOUDY liquids: coffee with creamer, tube feeds, cloudy juices, 6 hours prior to surgery/procedure time.  -CLEAR liquids include only water, black coffee NO creamer, clear oral rehydration drinks, clear sports drinks or clear fruit juices (no orange juice, no pulpy juices, no apple cider)  -IF IN DOUBT, drink water instead.   -Take a shower or bath (shower is recommended). Bathe with Hibiciens soap or an antibacterial soap from the neck down. If not supplied by your surgeon, Hibiciens soap will need to be purchased over the counter in the pharmacy. Rinse soap off thoroughly.  -Shampoo your hair with your regular shampoo    The Day of Surgery (or as per your Surgeon)  -The patient should be ENCOURAGED to drink carbohydrate-rich clear liquids (sports drinks, clear juices) until 2 hours prior to surgery/procedure time.  -NOTHING TO DRINK 2 hours before to surgery/procedure time. If you are told to take medication on the morning of surgery, it may be taken with a sip of water.   -Take another bath or shower with Hibiciens or any antibacterial soap, to reduce the chance of infection.  -Take heart and blood pressure medications with a small sip of water, as advised by the perioperative team.  -Do not take fluid pills  -You may brush your teeth and rinse your mouth, but do not swallow any additional water.  -Do not apply perfumes, powder, body lotions, or deodorant on the day of surgery.  -Nail polish should be removed  -Do not wear makeup or moisturizer  -Wear comfortable clothes, such as a button front shirt and loose fitting pants.  -Leave all jewelry, including body piercings, and valuables at home.  -Bring any devices you will need after surgery such as crutches or canes.  -If you have sleep apnea, please bring your CPAP machine    In the event of your physical conditions including the onset of a cold or respiratory illness, or if you have to delay or cancel your surgery, please  notify your surgeon.     If you have any questions or concerns, please don't hesitate to call.    Sincerely,    Stephenie [phone]

## 2018-12-05 NOTE — PHYSICIAN QUERY
PT Name: Radha Moses  MR #: [id number]     Physician Query Form - Documentation Clarification      CDS/: Juany Morrow RN, CDS               Contact information: [email or web address]    This form is a permanent document in the medical record.     Query Date: December 5, 2018    By submitting this query, we are merely seeking further clarification of documentation. Please utilize your independent clinical judgment when addressing the question(s) below.    The Medical record reflects the following:    Supporting Clinical Findings Location in Medical Record     --Weight: 100.3 kg (221 lb 1.9 oz)   --Body mass index is 41.78 kg/m².   --well developed, obese,      NeuroSx Note 11/28                                                                                Doctor, Please specify diagnosis or diagnoses associated with above clinical findings.    Provider Use Only      [ X ] Obesity    [  ] Morbid Obesity    [  ] Other; ____________________                                                                                                             [  ] Clinically Undetermined         Please call with any questions      Jana Kwan PA-C   Neurosurgery   Pager: [phone]

## 2018-12-06 ENCOUNTER — ANESTHESIA (OUTPATIENT)
Dept: SURGERY | Facility: HOSPITAL | Age: 33
DRG: 025 | End: 2018-12-06
Payer: MEDICAID

## 2018-12-08 LAB — INSULIN-LIKE GROWTH FACTOR 2: 792 NG/ML

## 2018-12-19 ENCOUNTER — TELEPHONE (OUTPATIENT)
Dept: NEUROSURGERY | Facility: CLINIC | Age: 33
End: 2018-12-19

## 2018-12-19 NOTE — TELEPHONE ENCOUNTER
Patient contacted. Advised to arrive for surgery at 0500, DOSC, NPO after 11pm the night before, shower x 2 with Hibiclens or Dial antibacterial soap. Understanding verbalized by patient.

## 2018-12-20 ENCOUNTER — HOSPITAL ENCOUNTER (INPATIENT)
Facility: HOSPITAL | Age: 33
LOS: 3 days | Discharge: HOME | DRG: 025 | End: 2018-12-23
Attending: NEUROLOGICAL SURGERY | Admitting: NEUROLOGICAL SURGERY
Payer: MEDICAID

## 2018-12-20 DIAGNOSIS — D32.9 MENINGIOMA: Primary | ICD-10-CM

## 2018-12-20 LAB
ABO + RH BLD: NORMAL
ANION GAP SERPL CALC-SCNC: 9 MMOL/L
APTT BLDCRRT: <21 SEC
B-HCG UR QL: NEGATIVE
BASOPHILS # BLD AUTO: 0.03 K/UL
BASOPHILS NFR BLD: 0.2 %
BLD GP AB SCN CELLS X3 SERPL QL: NORMAL
BLOOD GROUP ANTIBODIES SERPL: NORMAL
BUN SERPL-MCNC: 11 MG/DL
CALCIUM SERPL-MCNC: 8.1 MG/DL
CHLORIDE SERPL-SCNC: 108 MMOL/L
CO2 SERPL-SCNC: 18 MMOL/L
CREAT SERPL-MCNC: 0.7 MG/DL
CTP QC/QA: YES
DIFFERENTIAL METHOD: ABNORMAL
EOSINOPHIL # BLD AUTO: 0 K/UL
EOSINOPHIL NFR BLD: 0 %
ERYTHROCYTE [DISTWIDTH] IN BLOOD BY AUTOMATED COUNT: 17.4 %
EST. GFR  (AFRICAN AMERICAN): >60 ML/MIN/1.73 M^2
EST. GFR  (NON AFRICAN AMERICAN): >60 ML/MIN/1.73 M^2
GLUCOSE SERPL-MCNC: 170 MG/DL
HCT VFR BLD AUTO: 29.7 %
HGB BLD-MCNC: 9.3 G/DL
IMM GRANULOCYTES # BLD AUTO: 0.13 K/UL
IMM GRANULOCYTES NFR BLD AUTO: 0.9 %
INR PPP: 1
LYMPHOCYTES # BLD AUTO: 1.2 K/UL
LYMPHOCYTES NFR BLD: 8 %
MCH RBC QN AUTO: 24.7 PG
MCHC RBC AUTO-ENTMCNC: 31.3 G/DL
MCV RBC AUTO: 79 FL
MONOCYTES # BLD AUTO: 0.3 K/UL
MONOCYTES NFR BLD: 2.2 %
NEUTROPHILS # BLD AUTO: 12.9 K/UL
NEUTROPHILS NFR BLD: 88.7 %
NRBC BLD-RTO: 0 /100 WBC
PLATELET # BLD AUTO: 360 K/UL
PMV BLD AUTO: 10.1 FL
POTASSIUM SERPL-SCNC: 4.6 MMOL/L
PROTHROMBIN TIME: 10.1 SEC
RBC # BLD AUTO: 3.77 M/UL
SODIUM SERPL-SCNC: 135 MMOL/L
WBC # BLD AUTO: 14.57 K/UL

## 2018-12-20 PROCEDURE — 88307 TISSUE EXAM BY PATHOLOGIST: CPT | Mod: 26,,, | Performed by: PATHOLOGY

## 2018-12-20 PROCEDURE — 81025 URINE PREGNANCY TEST: CPT | Performed by: NEUROLOGICAL SURGERY

## 2018-12-20 PROCEDURE — 25000003 PHARM REV CODE 250: Performed by: NURSE ANESTHETIST, CERTIFIED REGISTERED

## 2018-12-20 PROCEDURE — P9021 RED BLOOD CELLS UNIT: HCPCS

## 2018-12-20 PROCEDURE — 63600175 PHARM REV CODE 636 W HCPCS: Performed by: NEUROLOGICAL SURGERY

## 2018-12-20 PROCEDURE — D9220A PRA ANESTHESIA: Mod: CRNA,,, | Performed by: NURSE ANESTHETIST, CERTIFIED REGISTERED

## 2018-12-20 PROCEDURE — 86901 BLOOD TYPING SEROLOGIC RH(D): CPT

## 2018-12-20 PROCEDURE — 85730 THROMBOPLASTIN TIME PARTIAL: CPT

## 2018-12-20 PROCEDURE — 71000039 HC RECOVERY, EACH ADD'L HOUR: Performed by: NEUROLOGICAL SURGERY

## 2018-12-20 PROCEDURE — 12000002 HC ACUTE/MED SURGE SEMI-PRIVATE ROOM

## 2018-12-20 PROCEDURE — 61512 PR EXCIS SUPRATENT MENINGIOMA: ICD-10-PCS | Mod: ,,, | Performed by: NEUROLOGICAL SURGERY

## 2018-12-20 PROCEDURE — 36000713 HC OR TIME LEV V EA ADD 15 MIN: Performed by: NEUROLOGICAL SURGERY

## 2018-12-20 PROCEDURE — 80048 BASIC METABOLIC PNL TOTAL CA: CPT

## 2018-12-20 PROCEDURE — 69990 MICROSURGERY ADD-ON: CPT | Mod: 59,,, | Performed by: NEUROLOGICAL SURGERY

## 2018-12-20 PROCEDURE — 27201037 HC PRESSURE MONITORING SET UP

## 2018-12-20 PROCEDURE — 63600175 PHARM REV CODE 636 W HCPCS: Performed by: NURSE ANESTHETIST, CERTIFIED REGISTERED

## 2018-12-20 PROCEDURE — 36620 INSERTION CATHETER ARTERY: CPT | Mod: 59,,, | Performed by: ANESTHESIOLOGY

## 2018-12-20 PROCEDURE — 61512 CRNEC TREPH EXC MNGIOMA STTL: CPT | Mod: ,,, | Performed by: NEUROLOGICAL SURGERY

## 2018-12-20 PROCEDURE — 63600175 PHARM REV CODE 636 W HCPCS: Performed by: STUDENT IN AN ORGANIZED HEALTH CARE EDUCATION/TRAINING PROGRAM

## 2018-12-20 PROCEDURE — 61781 SCAN PROC CRANIAL INTRA: CPT | Mod: ,,, | Performed by: NEUROLOGICAL SURGERY

## 2018-12-20 PROCEDURE — 36000712 HC OR TIME LEV V 1ST 15 MIN: Performed by: NEUROLOGICAL SURGERY

## 2018-12-20 PROCEDURE — 37000008 HC ANESTHESIA 1ST 15 MINUTES: Performed by: NEUROLOGICAL SURGERY

## 2018-12-20 PROCEDURE — 25000003 PHARM REV CODE 250: Performed by: NEUROLOGICAL SURGERY

## 2018-12-20 PROCEDURE — 71000033 HC RECOVERY, INTIAL HOUR: Performed by: NEUROLOGICAL SURGERY

## 2018-12-20 PROCEDURE — 37000009 HC ANESTHESIA EA ADD 15 MINS: Performed by: NEUROLOGICAL SURGERY

## 2018-12-20 PROCEDURE — C1729 CATH, DRAINAGE: HCPCS | Performed by: NEUROLOGICAL SURGERY

## 2018-12-20 PROCEDURE — C9113 INJ PANTOPRAZOLE SODIUM, VIA: HCPCS | Performed by: STUDENT IN AN ORGANIZED HEALTH CARE EDUCATION/TRAINING PROGRAM

## 2018-12-20 PROCEDURE — 85610 PROTHROMBIN TIME: CPT

## 2018-12-20 PROCEDURE — 99233 SBSQ HOSP IP/OBS HIGH 50: CPT | Mod: ,,, | Performed by: NURSE PRACTITIONER

## 2018-12-20 PROCEDURE — 61781 PR STEREOTACTIC COMP ASSIST PROC,CRANIAL,INTRADURAL: ICD-10-PCS | Mod: ,,, | Performed by: NEUROLOGICAL SURGERY

## 2018-12-20 PROCEDURE — 86922 COMPATIBILITY TEST ANTIGLOB: CPT

## 2018-12-20 PROCEDURE — 27201423 OPTIME MED/SURG SUP & DEVICES STERILE SUPPLY: Performed by: NEUROLOGICAL SURGERY

## 2018-12-20 PROCEDURE — D9220A PRA ANESTHESIA: Mod: ANES,,, | Performed by: ANESTHESIOLOGY

## 2018-12-20 PROCEDURE — 94761 N-INVAS EAR/PLS OXIMETRY MLT: CPT

## 2018-12-20 PROCEDURE — 88307 TISSUE SPECIMEN TO PATHOLOGY - SURGERY: ICD-10-PCS | Mod: 26,,, | Performed by: PATHOLOGY

## 2018-12-20 PROCEDURE — 25000003 PHARM REV CODE 250: Performed by: STUDENT IN AN ORGANIZED HEALTH CARE EDUCATION/TRAINING PROGRAM

## 2018-12-20 PROCEDURE — C1713 ANCHOR/SCREW BN/BN,TIS/BN: HCPCS | Performed by: NEUROLOGICAL SURGERY

## 2018-12-20 PROCEDURE — 86870 RBC ANTIBODY IDENTIFICATION: CPT

## 2018-12-20 PROCEDURE — 86902 BLOOD TYPE ANTIGEN DONOR EA: CPT

## 2018-12-20 PROCEDURE — 99233 PR SUBSEQUENT HOSPITAL CARE,LEVL III: ICD-10-PCS | Mod: ,,, | Performed by: NURSE PRACTITIONER

## 2018-12-20 PROCEDURE — 85025 COMPLETE CBC W/AUTO DIFF WBC: CPT

## 2018-12-20 PROCEDURE — 88307 TISSUE EXAM BY PATHOLOGIST: CPT | Performed by: PATHOLOGY

## 2018-12-20 PROCEDURE — 27000221 HC OXYGEN, UP TO 24 HOURS

## 2018-12-20 PROCEDURE — 69990 PR MICROSURG TECHNIQUES,REQ OPER MICROSCOPE: ICD-10-PCS | Mod: 59,,, | Performed by: NEUROLOGICAL SURGERY

## 2018-12-20 DEVICE — PLATE BONE BUR HOLE COVER 10MM: Type: IMPLANTABLE DEVICE | Site: CRANIAL | Status: FUNCTIONAL

## 2018-12-20 DEVICE — PLATE BONE RIGID 2HOLE .6X12MM: Type: IMPLANTABLE DEVICE | Site: CRANIAL | Status: FUNCTIONAL

## 2018-12-20 DEVICE — IMPLANTABLE DEVICE: Type: IMPLANTABLE DEVICE | Site: CRANIAL | Status: FUNCTIONAL

## 2018-12-20 RX ORDER — LIDOCAINE HCL/PF 100 MG/5ML
SYRINGE (ML) INTRAVENOUS
Status: DISCONTINUED | OUTPATIENT
Start: 2018-12-20 | End: 2018-12-20

## 2018-12-20 RX ORDER — LEVETIRACETAM 5 MG/ML
500 INJECTION INTRAVASCULAR EVERY 12 HOURS
Status: DISCONTINUED | OUTPATIENT
Start: 2018-12-20 | End: 2018-12-24 | Stop reason: HOSPADM

## 2018-12-20 RX ORDER — GLYCOPYRROLATE 0.2 MG/ML
INJECTION INTRAMUSCULAR; INTRAVENOUS
Status: DISCONTINUED | OUTPATIENT
Start: 2018-12-20 | End: 2018-12-20

## 2018-12-20 RX ORDER — LEVETIRACETAM 500 MG/5ML
INJECTION, SOLUTION, CONCENTRATE INTRAVENOUS
Status: DISCONTINUED | OUTPATIENT
Start: 2018-12-20 | End: 2018-12-20

## 2018-12-20 RX ORDER — ONDANSETRON 2 MG/ML
INJECTION INTRAMUSCULAR; INTRAVENOUS
Status: DISCONTINUED | OUTPATIENT
Start: 2018-12-20 | End: 2018-12-20

## 2018-12-20 RX ORDER — PROPOFOL 10 MG/ML
VIAL (ML) INTRAVENOUS
Status: DISCONTINUED | OUTPATIENT
Start: 2018-12-20 | End: 2018-12-20

## 2018-12-20 RX ORDER — ONDANSETRON 8 MG/1
8 TABLET, ORALLY DISINTEGRATING ORAL EVERY 4 HOURS PRN
Status: DISCONTINUED | OUTPATIENT
Start: 2018-12-20 | End: 2018-12-24 | Stop reason: HOSPADM

## 2018-12-20 RX ORDER — MANNITOL 250 MG/ML
INJECTION, SOLUTION INTRAVENOUS
Status: DISCONTINUED | OUTPATIENT
Start: 2018-12-20 | End: 2018-12-20

## 2018-12-20 RX ORDER — HEPARIN SODIUM 5000 [USP'U]/ML
5000 INJECTION, SOLUTION INTRAVENOUS; SUBCUTANEOUS EVERY 8 HOURS
Status: DISCONTINUED | OUTPATIENT
Start: 2018-12-21 | End: 2018-12-24 | Stop reason: HOSPADM

## 2018-12-20 RX ORDER — SODIUM CHLORIDE 0.9 % (FLUSH) 0.9 %
3 SYRINGE (ML) INJECTION
Status: DISCONTINUED | OUTPATIENT
Start: 2018-12-20 | End: 2018-12-21

## 2018-12-20 RX ORDER — NEOSTIGMINE METHYLSULFATE 1 MG/ML
INJECTION, SOLUTION INTRAVENOUS
Status: DISCONTINUED | OUTPATIENT
Start: 2018-12-20 | End: 2018-12-20

## 2018-12-20 RX ORDER — ACETAMINOPHEN 650 MG/1
650 SUPPOSITORY RECTAL EVERY 6 HOURS PRN
Status: DISCONTINUED | OUTPATIENT
Start: 2018-12-20 | End: 2018-12-21

## 2018-12-20 RX ORDER — BACITRACIN ZINC 500 UNIT/G
OINTMENT (GRAM) TOPICAL
Status: DISCONTINUED | OUTPATIENT
Start: 2018-12-20 | End: 2018-12-20 | Stop reason: HOSPADM

## 2018-12-20 RX ORDER — HYDROCODONE BITARTRATE AND ACETAMINOPHEN 5; 325 MG/1; MG/1
1 TABLET ORAL EVERY 4 HOURS PRN
Status: DISCONTINUED | OUTPATIENT
Start: 2018-12-20 | End: 2018-12-24 | Stop reason: HOSPADM

## 2018-12-20 RX ORDER — HYDROMORPHONE HYDROCHLORIDE 1 MG/ML
0.2 INJECTION, SOLUTION INTRAMUSCULAR; INTRAVENOUS; SUBCUTANEOUS EVERY 5 MIN PRN
Status: DISCONTINUED | OUTPATIENT
Start: 2018-12-20 | End: 2018-12-21

## 2018-12-20 RX ORDER — MIDAZOLAM HYDROCHLORIDE 1 MG/ML
INJECTION INTRAMUSCULAR; INTRAVENOUS
Status: DISCONTINUED | OUTPATIENT
Start: 2018-12-20 | End: 2018-12-20

## 2018-12-20 RX ORDER — LIDOCAINE HYDROCHLORIDE AND EPINEPHRINE 10; 10 MG/ML; UG/ML
INJECTION, SOLUTION INFILTRATION; PERINEURAL
Status: DISCONTINUED | OUTPATIENT
Start: 2018-12-20 | End: 2018-12-20 | Stop reason: HOSPADM

## 2018-12-20 RX ORDER — MUPIROCIN 20 MG/G
1 OINTMENT TOPICAL
Status: COMPLETED | OUTPATIENT
Start: 2018-12-20 | End: 2018-12-20

## 2018-12-20 RX ORDER — MUPIROCIN 20 MG/G
OINTMENT TOPICAL
Status: DISCONTINUED | OUTPATIENT
Start: 2018-12-20 | End: 2018-12-20

## 2018-12-20 RX ORDER — FAMOTIDINE 20 MG/1
20 TABLET, FILM COATED ORAL 2 TIMES DAILY
Status: DISCONTINUED | OUTPATIENT
Start: 2018-12-20 | End: 2018-12-24 | Stop reason: HOSPADM

## 2018-12-20 RX ORDER — PANTOPRAZOLE SODIUM 40 MG/10ML
40 INJECTION, POWDER, LYOPHILIZED, FOR SOLUTION INTRAVENOUS DAILY
Status: DISCONTINUED | OUTPATIENT
Start: 2018-12-20 | End: 2018-12-21

## 2018-12-20 RX ORDER — MEPERIDINE HYDROCHLORIDE 50 MG/ML
12.5 INJECTION INTRAMUSCULAR; INTRAVENOUS; SUBCUTANEOUS ONCE
Status: DISCONTINUED | OUTPATIENT
Start: 2018-12-20 | End: 2018-12-20

## 2018-12-20 RX ORDER — ROCURONIUM BROMIDE 10 MG/ML
INJECTION, SOLUTION INTRAVENOUS
Status: DISCONTINUED | OUTPATIENT
Start: 2018-12-20 | End: 2018-12-20

## 2018-12-20 RX ORDER — DEXAMETHASONE SODIUM PHOSPHATE 4 MG/ML
4 INJECTION, SOLUTION INTRA-ARTICULAR; INTRALESIONAL; INTRAMUSCULAR; INTRAVENOUS; SOFT TISSUE EVERY 6 HOURS
Status: DISCONTINUED | OUTPATIENT
Start: 2018-12-20 | End: 2018-12-23

## 2018-12-20 RX ORDER — HYDROMORPHONE HYDROCHLORIDE 1 MG/ML
1 INJECTION, SOLUTION INTRAMUSCULAR; INTRAVENOUS; SUBCUTANEOUS EVERY 4 HOURS PRN
Status: DISCONTINUED | OUTPATIENT
Start: 2018-12-20 | End: 2018-12-24 | Stop reason: HOSPADM

## 2018-12-20 RX ORDER — SODIUM CHLORIDE AND POTASSIUM CHLORIDE 150; 900 MG/100ML; MG/100ML
INJECTION, SOLUTION INTRAVENOUS CONTINUOUS
Status: DISCONTINUED | OUTPATIENT
Start: 2018-12-20 | End: 2018-12-20

## 2018-12-20 RX ORDER — BUPIVACAINE HYDROCHLORIDE AND EPINEPHRINE 5; 5 MG/ML; UG/ML
INJECTION, SOLUTION EPIDURAL; INTRACAUDAL; PERINEURAL
Status: DISCONTINUED | OUTPATIENT
Start: 2018-12-20 | End: 2018-12-20 | Stop reason: HOSPADM

## 2018-12-20 RX ORDER — MUPIROCIN 20 MG/G
1 OINTMENT TOPICAL 2 TIMES DAILY
Status: DISCONTINUED | OUTPATIENT
Start: 2018-12-20 | End: 2018-12-24 | Stop reason: HOSPADM

## 2018-12-20 RX ORDER — ACETAMINOPHEN 325 MG/1
650 TABLET ORAL EVERY 6 HOURS PRN
Status: DISCONTINUED | OUTPATIENT
Start: 2018-12-20 | End: 2018-12-24 | Stop reason: HOSPADM

## 2018-12-20 RX ORDER — FENTANYL CITRATE 50 UG/ML
INJECTION, SOLUTION INTRAMUSCULAR; INTRAVENOUS
Status: DISCONTINUED | OUTPATIENT
Start: 2018-12-20 | End: 2018-12-20

## 2018-12-20 RX ORDER — SODIUM CHLORIDE 9 MG/ML
INJECTION, SOLUTION INTRAVENOUS CONTINUOUS
Status: DISCONTINUED | OUTPATIENT
Start: 2018-12-20 | End: 2018-12-20

## 2018-12-20 RX ORDER — NICARDIPINE HYDROCHLORIDE 0.2 MG/ML
1 INJECTION INTRAVENOUS CONTINUOUS
Status: DISCONTINUED | OUTPATIENT
Start: 2018-12-20 | End: 2018-12-21

## 2018-12-20 RX ORDER — ONDANSETRON 2 MG/ML
4 INJECTION INTRAMUSCULAR; INTRAVENOUS DAILY PRN
Status: COMPLETED | OUTPATIENT
Start: 2018-12-20 | End: 2018-12-20

## 2018-12-20 RX ORDER — LIDOCAINE HYDROCHLORIDE 10 MG/ML
1 INJECTION, SOLUTION EPIDURAL; INFILTRATION; INTRACAUDAL; PERINEURAL ONCE
Status: COMPLETED | OUTPATIENT
Start: 2018-12-20 | End: 2018-12-20

## 2018-12-20 RX ORDER — BACITRACIN 50000 [IU]/1
INJECTION, POWDER, FOR SOLUTION INTRAMUSCULAR
Status: DISCONTINUED | OUTPATIENT
Start: 2018-12-20 | End: 2018-12-20 | Stop reason: HOSPADM

## 2018-12-20 RX ORDER — ALBUTEROL SULFATE 90 UG/1
2 AEROSOL, METERED RESPIRATORY (INHALATION) EVERY 6 HOURS PRN
Status: DISCONTINUED | OUTPATIENT
Start: 2018-12-20 | End: 2018-12-24 | Stop reason: HOSPADM

## 2018-12-20 RX ORDER — SODIUM CHLORIDE 9 MG/ML
INJECTION, SOLUTION INTRAVENOUS CONTINUOUS
Status: DISCONTINUED | OUTPATIENT
Start: 2018-12-20 | End: 2018-12-21

## 2018-12-20 RX ADMIN — ONDANSETRON 4 MG: 2 INJECTION INTRAMUSCULAR; INTRAVENOUS at 11:12

## 2018-12-20 RX ADMIN — MIDAZOLAM HYDROCHLORIDE 2 MG: 1 INJECTION, SOLUTION INTRAMUSCULAR; INTRAVENOUS at 06:12

## 2018-12-20 RX ADMIN — SODIUM CHLORIDE: 0.9 INJECTION, SOLUTION INTRAVENOUS at 05:12

## 2018-12-20 RX ADMIN — LIDOCAINE HYDROCHLORIDE 100 MG: 20 INJECTION, SOLUTION INTRAVENOUS at 07:12

## 2018-12-20 RX ADMIN — FENTANYL CITRATE 50 MCG: 50 INJECTION, SOLUTION INTRAMUSCULAR; INTRAVENOUS at 11:12

## 2018-12-20 RX ADMIN — PROPOFOL 50 MG: 10 INJECTION, EMULSION INTRAVENOUS at 11:12

## 2018-12-20 RX ADMIN — PANTOPRAZOLE SODIUM 40 MG: 40 INJECTION, POWDER, FOR SOLUTION INTRAVENOUS at 01:12

## 2018-12-20 RX ADMIN — DEXAMETHASONE SODIUM PHOSPHATE 4 MG: 4 INJECTION, SOLUTION INTRAMUSCULAR; INTRAVENOUS at 06:12

## 2018-12-20 RX ADMIN — SODIUM CHLORIDE, SODIUM GLUCONATE, SODIUM ACETATE, POTASSIUM CHLORIDE, MAGNESIUM CHLORIDE, SODIUM PHOSPHATE, DIBASIC, AND POTASSIUM PHOSPHATE: .53; .5; .37; .037; .03; .012; .00082 INJECTION, SOLUTION INTRAVENOUS at 11:12

## 2018-12-20 RX ADMIN — MANNITOL 85 G: 250 INJECTION, SOLUTION INTRAVENOUS at 08:12

## 2018-12-20 RX ADMIN — LIDOCAINE HYDROCHLORIDE 0.1 MG: 10 INJECTION, SOLUTION EPIDURAL; INFILTRATION; INTRACAUDAL; PERINEURAL at 05:12

## 2018-12-20 RX ADMIN — MUPIROCIN 1 G: 20 OINTMENT TOPICAL at 06:12

## 2018-12-20 RX ADMIN — FENTANYL CITRATE 100 MCG: 50 INJECTION, SOLUTION INTRAMUSCULAR; INTRAVENOUS at 07:12

## 2018-12-20 RX ADMIN — FAMOTIDINE 20 MG: 20 TABLET ORAL at 08:12

## 2018-12-20 RX ADMIN — LEVETIRACETAM 1000 MG: 100 INJECTION, SOLUTION, CONCENTRATE INTRAVENOUS at 08:12

## 2018-12-20 RX ADMIN — PROPOFOL 50 MG: 10 INJECTION, EMULSION INTRAVENOUS at 12:12

## 2018-12-20 RX ADMIN — HYDROCODONE BITARTRATE AND ACETAMINOPHEN 1 TABLET: 5; 325 TABLET ORAL at 07:12

## 2018-12-20 RX ADMIN — CEFTRIAXONE 2 G: 2 INJECTION, SOLUTION INTRAVENOUS at 08:12

## 2018-12-20 RX ADMIN — PROPOFOL 150 MG: 10 INJECTION, EMULSION INTRAVENOUS at 07:12

## 2018-12-20 RX ADMIN — SODIUM CHLORIDE, SODIUM GLUCONATE, SODIUM ACETATE, POTASSIUM CHLORIDE, MAGNESIUM CHLORIDE, SODIUM PHOSPHATE, DIBASIC, AND POTASSIUM PHOSPHATE: .53; .5; .37; .037; .03; .012; .00082 INJECTION, SOLUTION INTRAVENOUS at 07:12

## 2018-12-20 RX ADMIN — NICARDIPINE HYDROCHLORIDE 1 MG/HR: 0.2 INJECTION, SOLUTION INTRAVENOUS at 05:12

## 2018-12-20 RX ADMIN — HYDROMORPHONE HYDROCHLORIDE 1 MG: 1 INJECTION, SOLUTION INTRAMUSCULAR; INTRAVENOUS; SUBCUTANEOUS at 09:12

## 2018-12-20 RX ADMIN — MUPIROCIN 1 G: 20 OINTMENT TOPICAL at 08:12

## 2018-12-20 RX ADMIN — DEXAMETHASONE SODIUM PHOSPHATE 4 MG: 4 INJECTION, SOLUTION INTRAMUSCULAR; INTRAVENOUS at 11:12

## 2018-12-20 RX ADMIN — NEOSTIGMINE METHYLSULFATE 5 MG: 1 INJECTION INTRAVENOUS at 12:12

## 2018-12-20 RX ADMIN — SODIUM CHLORIDE: 0.9 INJECTION, SOLUTION INTRAVENOUS at 12:12

## 2018-12-20 RX ADMIN — ROCURONIUM BROMIDE 50 MG: 10 INJECTION, SOLUTION INTRAVENOUS at 07:12

## 2018-12-20 RX ADMIN — GLYCOPYRROLATE 0.6 MG: 0.2 INJECTION, SOLUTION INTRAMUSCULAR; INTRAVENOUS at 12:12

## 2018-12-20 RX ADMIN — HYDROCODONE BITARTRATE AND ACETAMINOPHEN 1 TABLET: 5; 325 TABLET ORAL at 03:12

## 2018-12-20 RX ADMIN — LEVETIRACETAM 500 MG: 5 INJECTION INTRAVENOUS at 10:12

## 2018-12-20 RX ADMIN — HYDROMORPHONE HYDROCHLORIDE 0.2 MG: 1 INJECTION, SOLUTION INTRAMUSCULAR; INTRAVENOUS; SUBCUTANEOUS at 02:12
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
## 2018-12-20 NOTE — TRANSFER OF CARE
"Anesthesia Transfer of Care Note    Patient: Radha Moses    Procedure(s) Performed: Procedure(s) (LRB):  CRANIOTOMY, USING FRAMELESS STEREOTAXY Right fronto-temporal (Right)    Patient location: PACU    Anesthesia Type: general    Transport from OR: Transported from OR on 6-10 L/min O2 by face mask with adequate spontaneous ventilation    Post pain: adequate analgesia    Post assessment: no apparent anesthetic complications    Post vital signs: stable    Level of consciousness: sedated    Nausea/Vomiting: no nausea/vomiting    Complications: none    Transfer of care protocol was followed      Last vitals:   Visit Vitals  /74 (BP Location: Left arm, Patient Position: Lying)   Pulse 94   Temp 36.1 °C (97 °F) (Temporal)   Resp 15   Ht 5' 3" (1.6 m)   Wt 85.7 kg (189 lb)   LMP 12/10/2018   SpO2 100%   Breastfeeding? No   BMI 33.48 kg/m²     "
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
## 2018-12-20 NOTE — ASSESSMENT & PLAN NOTE
S/P R frontotemperol craniotomy for meningioma resection, subgaleal drain.  NSGY following  CTH 12/20 pm pending  Neurochecks q1h  Decadron 4mg q6h  keppra 500mg bid, seizre ppx.  Ceftriaxone 2g q12h post-op while drain in place  IVF NS 100cc/h  Pain regimen:Norco 5/325 1tab q4hprn mod. Pain                        Dilaudid 1mg q4h prn severe pain                        Tylenol 650mg q6h prn mild pain  PT/OT/SLP

## 2018-12-20 NOTE — BRIEF OP NOTE
[facility]  Brief Operative Note    SUMMARY     Surgery Date: 12/20/2018     Surgeon(s) and Role:     * Oscar Linda MD - Primary     * Kyle Green MD - Resident - Assisting        Pre-op Diagnosis:  Meningioma [D32.9]    Post-op Diagnosis:  Post-Op Diagnosis Codes:     * Meningioma [D32.9]    Procedure(s) (LRB):  CRANIOTOMY, USING FRAMELESS STEREOTAXY Right fronto-temporal (Right)    Anesthesia: General    Description of Procedure: Left Frontotemporal Craniotomy for Subfrontal Approach for Subtotal Resection of Tuberculum Sellae Mass.      Estimated Blood Loss: * No values recorded between 12/20/2018  8:40 AM and 12/20/2018 12:24 PM *         Specimens:   Specimen (12h ago, onward)    Start     Ordered    12/20/18 1120  Specimen to Pathology - Surgery  Once     Comments:  1) Brain Tumor---PERM     Start Status   12/20/18 1120 Collected (12/20/18 1120)       12/20/18 1120

## 2018-12-20 NOTE — HOSPITAL COURSE
12/20 admit to [facility] s/p right frontotemporal craniotomy, with subgaleal drain. POD#0. CTH this pm pending  12/21: stepdown to NSGY team

## 2018-12-20 NOTE — ANESTHESIA PROCEDURE NOTES
Arterial    Diagnosis: Pituitary Adenoma    Patient location during procedure: done in OR  Procedure start time: 12/20/2018 7:18 AM  Timeout: 12/20/2018 7:17 AM  Procedure end time: 12/20/2018 7:30 AM  Staffing  Anesthesiologist: Anurag Pulido Jr., MD  Resident/CRNA: Wyatt Barnett CRNA  Other anesthesia staff: Adriana Carrillo  Performed: other anesthesia staff   Anesthesiologist was present at the time of the procedure.  Preanesthetic Checklist  Completed: patient identified, site marked, surgical consent, pre-op evaluation, timeout performed, IV checked, risks and benefits discussed, monitors and equipment checked and anesthesia consent givenArterial  Skin Prep: chlorhexidine gluconate  Local Infiltration: none  Orientation: left  Location: radial  Catheter Size: 20 G  Catheter placement by Ultrasound guidance. Heme positive aspiration all ports.  Vessel Caliber: medium, patent, compressibility normal  Vascular Doppler:  not done  Needle advanced into vessel with real time Ultrasound guidance.Insertion Attempts: 2  Assessment  Dressing: secured with tape and tegaderm  Patient: Tolerated well

## 2018-12-20 NOTE — ANESTHESIA POSTPROCEDURE EVALUATION
"Anesthesia Post Evaluation    Patient: Radha Moses    Procedure(s) Performed: Procedure(s) (LRB):  CRANIOTOMY, USING FRAMELESS STEREOTAXY Right fronto-temporal (Right)    Final Anesthesia Type: general  Patient location during evaluation: PACU  Patient participation: Yes- Able to Participate  Level of consciousness: awake and alert  Post-procedure vital signs: reviewed and stable  Pain management: adequate  Airway patency: patent  PONV status at discharge: No PONV  Anesthetic complications: no      Cardiovascular status: blood pressure returned to baseline  Respiratory status: unassisted  Hydration status: euvolemic  Follow-up not needed.        Visit Vitals  /77 (BP Location: Left arm, Patient Position: Lying)   Pulse 96   Temp 36.3 °C (97.4 °F) (Temporal)   Resp 18   Ht 5' 3" (1.6 m)   Wt 85.7 kg (189 lb)   LMP 12/10/2018   SpO2 100%   Breastfeeding? No   BMI 33.48 kg/m²       Pain/Terrell Score: Pain Rating Prior to Med Admin: 4 (12/20/2018  3:05 PM)  Terrell Score: 9 (12/20/2018  4:30 PM)        "

## 2018-12-21 LAB
ALLENS TEST: ABNORMAL
ANION GAP SERPL CALC-SCNC: 10 MMOL/L
BASOPHILS # BLD AUTO: 0.01 K/UL
BASOPHILS NFR BLD: 0.1 %
BUN SERPL-MCNC: 12 MG/DL
CALCIUM SERPL-MCNC: 8.5 MG/DL
CHLORIDE SERPL-SCNC: 112 MMOL/L
CO2 SERPL-SCNC: 17 MMOL/L
CREAT SERPL-MCNC: 0.7 MG/DL
DELSYS: ABNORMAL
DIFFERENTIAL METHOD: ABNORMAL
EOSINOPHIL # BLD AUTO: 0 K/UL
EOSINOPHIL NFR BLD: 0 %
ERYTHROCYTE [DISTWIDTH] IN BLOOD BY AUTOMATED COUNT: 17.6 %
ERYTHROCYTE [SEDIMENTATION RATE] IN BLOOD BY WESTERGREN METHOD: 28 MM/H
EST. GFR  (AFRICAN AMERICAN): >60 ML/MIN/1.73 M^2
EST. GFR  (NON AFRICAN AMERICAN): >60 ML/MIN/1.73 M^2
GLUCOSE SERPL-MCNC: 118 MG/DL
HCO3 UR-SCNC: 20.9 MMOL/L (ref 24–28)
HCT VFR BLD AUTO: 28.7 %
HGB BLD-MCNC: 9 G/DL
IMM GRANULOCYTES # BLD AUTO: 0.12 K/UL
IMM GRANULOCYTES NFR BLD AUTO: 0.7 %
LYMPHOCYTES # BLD AUTO: 0.9 K/UL
LYMPHOCYTES NFR BLD: 4.7 %
MCH RBC QN AUTO: 25.1 PG
MCHC RBC AUTO-ENTMCNC: 31.4 G/DL
MCV RBC AUTO: 80 FL
MODE: ABNORMAL
MONOCYTES # BLD AUTO: 0.8 K/UL
MONOCYTES NFR BLD: 4.3 %
NEUTROPHILS # BLD AUTO: 16.6 K/UL
NEUTROPHILS NFR BLD: 90.2 %
NRBC BLD-RTO: 0 /100 WBC
PCO2 BLDA: 27.7 MMHG (ref 35–45)
PH SMN: 7.49 [PH] (ref 7.35–7.45)
PLATELET # BLD AUTO: 302 K/UL
PMV BLD AUTO: 9.9 FL
PO2 BLDA: 108 MMHG (ref 80–100)
POC BE: -2 MMOL/L
POC SATURATED O2: 99 % (ref 95–100)
POC TCO2: 22 MMOL/L (ref 23–27)
POTASSIUM SERPL-SCNC: 4.5 MMOL/L
RBC # BLD AUTO: 3.58 M/UL
SAMPLE: ABNORMAL
SITE: ABNORMAL
SODIUM SERPL-SCNC: 139 MMOL/L
SP02: 99
WBC # BLD AUTO: 18.43 K/UL

## 2018-12-21 PROCEDURE — 25000003 PHARM REV CODE 250: Performed by: NURSE PRACTITIONER

## 2018-12-21 PROCEDURE — 94761 N-INVAS EAR/PLS OXIMETRY MLT: CPT

## 2018-12-21 PROCEDURE — C9113 INJ PANTOPRAZOLE SODIUM, VIA: HCPCS | Performed by: STUDENT IN AN ORGANIZED HEALTH CARE EDUCATION/TRAINING PROGRAM

## 2018-12-21 PROCEDURE — 80048 BASIC METABOLIC PNL TOTAL CA: CPT

## 2018-12-21 PROCEDURE — 63600175 PHARM REV CODE 636 W HCPCS

## 2018-12-21 PROCEDURE — 82803 BLOOD GASES ANY COMBINATION: CPT

## 2018-12-21 PROCEDURE — 99233 PR SUBSEQUENT HOSPITAL CARE,LEVL III: ICD-10-PCS | Mod: ,,, | Performed by: NURSE PRACTITIONER

## 2018-12-21 PROCEDURE — 37799 UNLISTED PX VASCULAR SURGERY: CPT

## 2018-12-21 PROCEDURE — 99233 SBSQ HOSP IP/OBS HIGH 50: CPT | Mod: ,,, | Performed by: NURSE PRACTITIONER

## 2018-12-21 PROCEDURE — 25000003 PHARM REV CODE 250: Performed by: STUDENT IN AN ORGANIZED HEALTH CARE EDUCATION/TRAINING PROGRAM

## 2018-12-21 PROCEDURE — 63600175 PHARM REV CODE 636 W HCPCS: Performed by: STUDENT IN AN ORGANIZED HEALTH CARE EDUCATION/TRAINING PROGRAM

## 2018-12-21 PROCEDURE — 20600001 HC STEP DOWN PRIVATE ROOM

## 2018-12-21 PROCEDURE — 25000242 PHARM REV CODE 250 ALT 637 W/ HCPCS: Performed by: NURSE PRACTITIONER

## 2018-12-21 PROCEDURE — 85025 COMPLETE CBC W/AUTO DIFF WBC: CPT

## 2018-12-21 RX ORDER — METOCLOPRAMIDE HYDROCHLORIDE 5 MG/ML
10 INJECTION INTRAMUSCULAR; INTRAVENOUS EVERY 6 HOURS PRN
Status: DISCONTINUED | OUTPATIENT
Start: 2018-12-21 | End: 2018-12-24 | Stop reason: HOSPADM

## 2018-12-21 RX ORDER — ONDANSETRON 2 MG/ML
4 INJECTION INTRAMUSCULAR; INTRAVENOUS ONCE
Status: COMPLETED | OUTPATIENT
Start: 2018-12-21 | End: 2018-12-21

## 2018-12-21 RX ORDER — ONDANSETRON 2 MG/ML
INJECTION INTRAMUSCULAR; INTRAVENOUS
Status: COMPLETED
Start: 2018-12-21 | End: 2018-12-21

## 2018-12-21 RX ORDER — AMOXICILLIN 250 MG
1 CAPSULE ORAL DAILY
Status: DISCONTINUED | OUTPATIENT
Start: 2018-12-21 | End: 2018-12-24 | Stop reason: HOSPADM

## 2018-12-21 RX ADMIN — LEVETIRACETAM 500 MG: 5 INJECTION INTRAVENOUS at 09:12

## 2018-12-21 RX ADMIN — HYDROCODONE BITARTRATE AND ACETAMINOPHEN 1 TABLET: 5; 325 TABLET ORAL at 10:12

## 2018-12-21 RX ADMIN — HYDROMORPHONE HYDROCHLORIDE 1 MG: 1 INJECTION, SOLUTION INTRAMUSCULAR; INTRAVENOUS; SUBCUTANEOUS at 10:12

## 2018-12-21 RX ADMIN — DEXAMETHASONE SODIUM PHOSPHATE 4 MG: 4 INJECTION, SOLUTION INTRAMUSCULAR; INTRAVENOUS at 12:12

## 2018-12-21 RX ADMIN — HYDROMORPHONE HYDROCHLORIDE 1 MG: 1 INJECTION, SOLUTION INTRAMUSCULAR; INTRAVENOUS; SUBCUTANEOUS at 04:12

## 2018-12-21 RX ADMIN — FAMOTIDINE 20 MG: 20 TABLET ORAL at 08:12

## 2018-12-21 RX ADMIN — STANDARDIZED SENNA CONCENTRATE AND DOCUSATE SODIUM 1 TABLET: 8.6; 5 TABLET, FILM COATED ORAL at 01:12

## 2018-12-21 RX ADMIN — HEPARIN SODIUM 5000 UNITS: 5000 INJECTION, SOLUTION INTRAVENOUS; SUBCUTANEOUS at 01:12

## 2018-12-21 RX ADMIN — MUPIROCIN 1 G: 20 OINTMENT TOPICAL at 09:12

## 2018-12-21 RX ADMIN — DEXAMETHASONE SODIUM PHOSPHATE 4 MG: 4 INJECTION, SOLUTION INTRAMUSCULAR; INTRAVENOUS at 06:12

## 2018-12-21 RX ADMIN — HYDROCODONE BITARTRATE AND ACETAMINOPHEN 1 TABLET: 5; 325 TABLET ORAL at 06:12

## 2018-12-21 RX ADMIN — ALBUTEROL SULFATE 2 PUFF: 90 AEROSOL, METERED RESPIRATORY (INHALATION) at 10:12

## 2018-12-21 RX ADMIN — ONDANSETRON 4 MG: 2 INJECTION INTRAMUSCULAR; INTRAVENOUS at 01:12

## 2018-12-21 RX ADMIN — HYDROCODONE BITARTRATE AND ACETAMINOPHEN 1 TABLET: 5; 325 TABLET ORAL at 04:12

## 2018-12-21 RX ADMIN — LEVETIRACETAM 500 MG: 5 INJECTION INTRAVENOUS at 08:12

## 2018-12-21 RX ADMIN — HEPARIN SODIUM 5000 UNITS: 5000 INJECTION, SOLUTION INTRAVENOUS; SUBCUTANEOUS at 09:12

## 2018-12-21 RX ADMIN — SODIUM CHLORIDE: 0.9 INJECTION, SOLUTION INTRAVENOUS at 08:12

## 2018-12-21 RX ADMIN — HEPARIN SODIUM 5000 UNITS: 5000 INJECTION, SOLUTION INTRAVENOUS; SUBCUTANEOUS at 06:12

## 2018-12-21 RX ADMIN — CEFTRIAXONE 2 G: 2 INJECTION, SOLUTION INTRAVENOUS at 07:12

## 2018-12-21 RX ADMIN — CEFTRIAXONE 2 G: 2 INJECTION, SOLUTION INTRAVENOUS at 08:12

## 2018-12-21 RX ADMIN — FAMOTIDINE 20 MG: 20 TABLET ORAL at 09:12

## 2018-12-21 RX ADMIN — MUPIROCIN 1 G: 20 OINTMENT TOPICAL at 08:12

## 2018-12-21 RX ADMIN — PANTOPRAZOLE SODIUM 40 MG: 40 INJECTION, POWDER, FOR SOLUTION INTRAVENOUS at 08:12

## 2018-12-21 NOTE — ASSESSMENT & PLAN NOTE
Monitor neuro exam  Decadron 4mg q6h  CTH 12/20 -Satisfactory postoperative appearance in this patient following resection of the sella and suprasellar extra-axial mass which had the appearance of a meningioma  12/21: stepdown to NSGY team

## 2018-12-21 NOTE — ASSESSMENT & PLAN NOTE
33 F w/tuberculum sella meningioma s/p resection    POD#1  Stepdown to floor today  Continue HV drain  Continue abx ppx while HV drain in place  Dex 4q6  Keppra 500 BID  PT/OT    Dispo: anticipate dispo over weekend

## 2018-12-21 NOTE — PROGRESS NOTES
Pt awake and alert, asking to urinate, [facility] service notified and okayed to get up to chair, sat at side of bed without problem and sat on bedpan on chair and voided.  Bedpan removed and pt sat in chair to eat lunch.
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
## 2018-12-21 NOTE — PLAN OF CARE
Pt AAOX4. Pt has remained stable during pacu stay. VSS. Patient states pain is tolerable. No N&V. Tolerated sips of water. Dressing to rt head intact with telfa dressing. Neuro intact. Drain in place to full suction.  Teds/SCD's in place throughout duration in PACU.

## 2018-12-21 NOTE — SUBJECTIVE & OBJECTIVE
Interval History: NAEON. Post op CT reviewed. States blurred vision improved this AM compared to yesterday.     Medications:  Continuous Infusions:  Scheduled Meds:   cefTRIAXone (ROCEPHIN) IVPB  2 g Intravenous Q12H    dexamethasone  4 mg Intravenous Q6H    famotidine  20 mg Oral BID    heparin (porcine)  5,000 Units Subcutaneous Q8H    levetiracetam IVPB  500 mg Intravenous Q12H    mupirocin  1 g Nasal BID    senna-docusate 8.6-50 mg  1 tablet Oral Daily     PRN Meds:acetaminophen, acetaminophen, albuterol, HYDROcodone-acetaminophen, HYDROmorphone, HYDROmorphone, metoclopramide HCl, ondansetron, sodium chloride 0.9%     Review of Systems  Objective:     Weight: 85.7 kg (189 lb)  Body mass index is 33.48 kg/m².  Vital Signs (Most Recent):  Temp: 98.6 °F (37 °C) (12/21/18 1200)  Pulse: 76 (12/21/18 1445)  Resp: (!) 22 (12/21/18 1445)  BP: 137/80 (12/21/18 1445)  SpO2: 100 % (12/21/18 1445) Vital Signs (24h Range):  Temp:  [97.8 °F (36.6 °C)-99.7 °F (37.6 °C)] 98.6 °F (37 °C)  Pulse:  [] 76  Resp:  [12-27] 22  SpO2:  [98 %-100 %] 100 %  BP: (115-155)/(64-90) 137/80  Arterial Line BP: ()/() 149/81     Date 12/21/18 0700 - 12/22/18 0659   Shift 1119-3960 6438-3264 7206-2119 24 Hour Total   INTAKE   I.V.(mL/kg) 2325(27.1)   2325(27.1)   Shift Total(mL/kg) 2325(27.1)   2325(27.1)   OUTPUT   Urine(mL/kg/hr) 525(0.8)   525   Drains 30   30   Shift Total(mL/kg) 555(6.5)   555(6.5)   Weight (kg) 85.7 85.7 85.7 85.7                        Closed/Suction Drain 12/20/18 1147 Right Other (Comment) Accordion 10 Fr. (Active)   Site Description Unable to view 12/21/2018  3:00 PM   Dressing Type [place] 12/21/2018  3:00 PM   Dressing Status Clean;Dry;Intact 12/21/2018  3:00 PM   Dressing Intervention Other (Comment) 12/21/2018 11:00 AM   Drainage Bloody 12/21/2018  3:00 PM   Status To bulb suction 12/21/2018  3:00 PM   Output (mL) 30 mL 12/21/2018 12:00 PM       Neurosurgery Physical Exam   Awake,  alert, NAD  PERRL  EOMI  VF grossly intact, able to count numbers in all 4 quadrants bilaterally, most difficulty in R inferior outer quadrant  FS in BUE/BLE  SILT  No drift      Significant Labs:  Recent Labs   Lab 12/20/18  1228 12/21/18  0600   * 118*   * 139   K 4.6 4.5    112*   CO2 18* 17*   BUN 11 12   CREATININE 0.7 0.7   CALCIUM 8.1* 8.5*     Recent Labs   Lab 12/20/18  1228 12/21/18  0600   WBC 14.57* 18.43*   HGB 9.3* 9.0*   HCT 29.7* 28.7*   * 302     Recent Labs   Lab 12/20/18  0655   INR 1.0   APTT <21.0     Microbiology Results (last 7 days)     ** No results found for the last 168 hours. **        Recent Lab Results       12/21/18  1321   12/21/18  0600        Immature Granulocytes   0.7     Immature Grans (Abs)   0.12  Comment:  Mild elevation in immature granulocytes is non specific and   can be seen in a variety of conditions including stress response,   acute inflammation, trauma and pregnancy. Correlation with other   laboratory and clinical findings is essential.       Allens Test N/A       Anion Gap   10     Baso #   0.01     Basophil%   0.1     Site Teressa/UAC       BUN, Bld   12     Calcium   8.5     Chloride   112     CO2   17     Creatinine   0.7     DelSys Room Air       Differential Method   Automated     eGFR if    >60.0     eGFR if non    >60.0  Comment:  Calculation used to obtain the estimated glomerular filtration  rate (eGFR) is the CKD-EPI equation.        Eos #   0.0     Eosinophil%   0.0     Glucose   118     Gran # (ANC)   16.6     Gran%   90.2     Hematocrit   28.7     Hemoglobin   9.0     Lymph #   0.9     Lymph%   4.7     MCH   25.1     MCHC   31.4     MCV   80     Mode SPONT       Mono #   0.8     Mono%   4.3     MPV   9.9     nRBC   0     Platelets   302     POC BE -2       POC HCO3 20.9       POC PCO2 27.7       POC PH 7.486       POC PO2 108       POC SATURATED O2 99       POC TCO2 22       Potassium   4.5     Rate  28       RBC   3.58     RDW   17.6     Sample ARTERIAL       Sodium   139     Sp02 99       WBC   18.43           Significant Diagnostics:  I have reviewed all pertinent imaging results/findings within the past 24 hours.

## 2018-12-21 NOTE — PROGRESS NOTES
[facility]  Neurosurgery  Progress Note    Subjective:     History of Present Illness: No notes on file    Post-Op Info:  Procedure(s) (LRB):  CRANIOTOMY, USING FRAMELESS STEREOTAXY Right fronto-temporal (Right)   1 Day Post-Op     Interval History: NAEON. Post op CT reviewed. States blurred vision improved this AM compared to yesterday.     Medications:  Continuous Infusions:  Scheduled Meds:   cefTRIAXone (ROCEPHIN) IVPB  2 g Intravenous Q12H    dexamethasone  4 mg Intravenous Q6H    famotidine  20 mg Oral BID    heparin (porcine)  5,000 Units Subcutaneous Q8H    levetiracetam IVPB  500 mg Intravenous Q12H    mupirocin  1 g Nasal BID    senna-docusate 8.6-50 mg  1 tablet Oral Daily     PRN Meds:acetaminophen, acetaminophen, albuterol, HYDROcodone-acetaminophen, HYDROmorphone, HYDROmorphone, metoclopramide HCl, ondansetron, sodium chloride 0.9%     Review of Systems  Objective:     Weight: 85.7 kg (189 lb)  Body mass index is 33.48 kg/m².  Vital Signs (Most Recent):  Temp: 98.6 °F (37 °C) (12/21/18 1200)  Pulse: 76 (12/21/18 1445)  Resp: (!) 22 (12/21/18 1445)  BP: 137/80 (12/21/18 1445)  SpO2: 100 % (12/21/18 1445) Vital Signs (24h Range):  Temp:  [97.8 °F (36.6 °C)-99.7 °F (37.6 °C)] 98.6 °F (37 °C)  Pulse:  [] 76  Resp:  [12-27] 22  SpO2:  [98 %-100 %] 100 %  BP: (115-155)/(64-90) 137/80  Arterial Line BP: ()/() 149/81     Date 12/21/18 0700 - 12/22/18 0659   Shift 6040-4997 4404-7062 8442-8148 24 Hour Total   INTAKE   I.V.(mL/kg) 2325(27.1)   2325(27.1)   Shift Total(mL/kg) 2325(27.1)   2325(27.1)   OUTPUT   Urine(mL/kg/hr) 525(0.8)   525   Drains 30   30   Shift Total(mL/kg) 555(6.5)   555(6.5)   Weight (kg) 85.7 85.7 85.7 85.7                        Closed/Suction Drain 12/20/18 1147 Right Other (Comment) Accordion 10 Fr. (Active)   Site Description Unable to view 12/21/2018  3:00 PM   Dressing Type [facility] 12/21/2018  3:00 PM   Dressing Status  Clean;Dry;Intact 12/21/2018  3:00 PM   Dressing Intervention Other (Comment) 12/21/2018 11:00 AM   Drainage Bloody 12/21/2018  3:00 PM   Status To bulb suction 12/21/2018  3:00 PM   Output (mL) 30 mL 12/21/2018 12:00 PM       Neurosurgery Physical Exam   Awake, alert, NAD  PERRL  EOMI  VF grossly intact, able to count numbers in all 4 quadrants bilaterally, most difficulty in R inferior outer quadrant  FS in BUE/BLE  SILT  No drift      Significant Labs:  Recent Labs   Lab 12/20/18  1228 12/21/18  0600   * 118*   * 139   K 4.6 4.5    112*   CO2 18* 17*   BUN 11 12   CREATININE 0.7 0.7   CALCIUM 8.1* 8.5*     Recent Labs   Lab 12/20/18  1228 12/21/18  0600   WBC 14.57* 18.43*   HGB 9.3* 9.0*   HCT 29.7* 28.7*   * 302     Recent Labs   Lab 12/20/18  0655   INR 1.0   APTT <21.0     Microbiology Results (last 7 days)     ** No results found for the last 168 hours. **        Recent Lab Results       12/21/18  1321   12/21/18  0600        Immature Granulocytes   0.7     Immature Grans (Abs)   0.12  Comment:  Mild elevation in immature granulocytes is non specific and   can be seen in a variety of conditions including stress response,   acute inflammation, trauma and pregnancy. Correlation with other   laboratory and clinical findings is essential.       Allens Test N/A       Anion Gap   10     Baso #   0.01     Basophil%   0.1     Site Teressa/UAC       BUN, Bld   12     Calcium   8.5     Chloride   112     CO2   17     Creatinine   0.7     DelSys Room Air       Differential Method   Automated     eGFR if    >60.0     eGFR if non    >60.0  Comment:  Calculation used to obtain the estimated glomerular filtration  rate (eGFR) is the CKD-EPI equation.        Eos #   0.0     Eosinophil%   0.0     Glucose   118     Gran # (ANC)   16.6     Gran%   90.2     Hematocrit   28.7     Hemoglobin   9.0     Lymph #   0.9     Lymph%   4.7     MCH   25.1     MCHC   31.4     MCV   80      Mode SPONT       Mono #   0.8     Mono%   4.3     MPV   9.9     nRBC   0     Platelets   302     POC BE -2       POC HCO3 20.9       POC PCO2 27.7       POC PH 7.486       POC PO2 108       POC SATURATED O2 99       POC TCO2 22       Potassium   4.5     Rate 28       RBC   3.58     RDW   17.6     Sample ARTERIAL       Sodium   139     Sp02 99       WBC   18.43           Significant Diagnostics:  I have reviewed all pertinent imaging results/findings within the past 24 hours.    Assessment/Plan:     Meningioma    33 F w/tuberculum sella meningioma s/p resection    POD#1  Stepdown to floor today  Continue HV drain  Continue abx ppx while HV drain in place  Dex 4q6  Keppra 500 BID  PT/OT    Dispo: anticipate dispo over weekend         Audra Gonzalez MD  Neurosurgery  [facility]

## 2018-12-21 NOTE — ANESTHESIA RELEASE NOTE
"Anesthesia Release from PACU Note    Patient: Radha Moses    Procedure(s) Performed: Procedure(s) (LRB):  CRANIOTOMY, USING FRAMELESS STEREOTAXY Right fronto-temporal (Right)    Anesthesia type: general    Post pain: Adequate analgesia    Post assessment: no apparent anesthetic complications    Last Vitals:   Visit Vitals  /77   Pulse 90   Temp 37 °C (98.6 °F) (Oral)   Resp 20   Ht 5' 3" (1.6 m)   Wt 85.7 kg (189 lb)   LMP 12/10/2018   SpO2 100%   Breastfeeding? No   BMI 33.48 kg/m²       Post vital signs: stable    Level of consciousness: awake    Nausea/Vomiting: no nausea/no vomiting    Complications: none    Airway Patency: patent    Respiratory: unassisted    Cardiovascular: stable and blood pressure at baseline    Hydration: euvolemic  "
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
## 2018-12-21 NOTE — SUBJECTIVE & OBJECTIVE
Review of Systems  Constitutional: Denies fevers, weight loss, chills, or weakness.  Eyes: Denies changes in vision.  ENT: Denies dysphagia, nasal discharge, ear pain or discharge.  Cardiovascular: Denies chest pain, palpitations, orthopnea, or claudication.  Respiratory: Denies shortness of breath, cough, hemoptysis, or wheezing.  GI: Denies nausea/vomitting, hematochezia, melena, abd pain, or changes in appetite.  : Denies dysuria, incontinence, or hematuria.  Musculoskeletal: Denies joint pain or myalgias.  Skin/breast: Denies rashes, lumps, lesions, or discharge.  Neurologic: Denies headache, dizziness, vertigo, or paresthesias.  Psychiatric: Denies changes in mood or hallucinations.  Endocrine: Denies polyuria, polydipsia, heat/cold intolerance.  Hematologic/Lymph: Denies lymphadenopathy, easy bruising or easy bleeding.  Allergic/Immunologic: Denies rash, rhinitis.   Objective:     Vitals:  Temp: 98.6 °F (37 °C)  Pulse: 93  Rhythm: normal sinus rhythm  BP: 134/84  MAP (mmHg): 103  Resp: (!) 27  SpO2: 100 %  O2 Device (Oxygen Therapy): room air    Temp  Min: 97.8 °F (36.6 °C)  Max: 99.7 °F (37.6 °C)  Pulse  Min: 78  Max: 104  BP  Min: 115/71  Max: 155/80  MAP (mmHg)  Min: 86  Max: 112  Resp  Min: 12  Max: 27  SpO2  Min: 98 %  Max: 100 %    12/20 0701 - 12/21 0700  In: 3200 [P.O.:400; I.V.:2800]  Out: 4885 [Urine:3910; Drains:375]           Physical Exam  GA: Alert, comfortable, no acute distress.   HEENT: No scleral icterus or JVD. Subgaleal drain present  Pulmonary: Clear to auscultation A/L. No wheezing, crackles, or rhonchi.  Cardiac: RRR S1 & S2 w/o rubs/murmurs/gallops.   Abdominal: Bowel sounds present x 4. No appreciable hepatosplenomegaly.  Skin: No jaundice, rashes, or visible lesions.  Neuro:  --GCS: E4 V5 M6  --Mental Status:  AAOX4, follows all commands, clear speach  --CN II-XII grossly intact.   --Pupils 3->2mm, PERRL.   --Corneal reflex, gag, cough intact.  --CAROLE  spont    Medications:  Continuous Scheduled  cefTRIAXone (ROCEPHIN) IVPB 2 g Q12H   dexamethasone 4 mg Q6H   famotidine 20 mg BID   heparin (porcine) 5,000 Units Q8H   levetiracetam IVPB 500 mg Q12H   mupirocin 1 g BID   senna-docusate 8.6-50 mg 1 tablet Daily   PRN  acetaminophen 650 mg Q6H PRN   acetaminophen 650 mg Q6H PRN   albuterol 2 puff Q6H PRN   HYDROcodone-acetaminophen 1 tablet Q4H PRN   HYDROmorphone 0.2 mg Q5 Min PRN   HYDROmorphone 1 mg Q4H PRN   metoclopramide HCl 10 mg Q6H PRN   ondansetron 8 mg Q4H PRN   sodium chloride 0.9% 3 mL PRN     Today I personally reviewed pertinent medications, lines/drains/airways, imaging, laboratory results,    Diet  Diet Adult Regular (IDDSI Level 7)

## 2018-12-21 NOTE — ASSESSMENT & PLAN NOTE
S/P R frontotemperol craniotomy for meningioma resection, subgaleal drain.  NSGY following  CTH 12/20 pm pending  Neurochecks q1h  Decadron 4mg q6h  keppra 500mg bid, seizre ppx.  Ceftriaxone 2g q12h post-op while drain in place  PT/OT/SLP  12/21: stepdown to NSGY team

## 2018-12-22 PROCEDURE — G8989 SELF CARE D/C STATUS: HCPCS | Mod: CH

## 2018-12-22 PROCEDURE — 97165 OT EVAL LOW COMPLEX 30 MIN: CPT

## 2018-12-22 PROCEDURE — 25000003 PHARM REV CODE 250: Performed by: NURSE PRACTITIONER

## 2018-12-22 PROCEDURE — G8988 SELF CARE GOAL STATUS: HCPCS | Mod: CH

## 2018-12-22 PROCEDURE — 25000003 PHARM REV CODE 250: Performed by: STUDENT IN AN ORGANIZED HEALTH CARE EDUCATION/TRAINING PROGRAM

## 2018-12-22 PROCEDURE — 25000242 PHARM REV CODE 250 ALT 637 W/ HCPCS: Performed by: NURSE PRACTITIONER

## 2018-12-22 PROCEDURE — G8987 SELF CARE CURRENT STATUS: HCPCS | Mod: CH

## 2018-12-22 PROCEDURE — 63600175 PHARM REV CODE 636 W HCPCS: Performed by: STUDENT IN AN ORGANIZED HEALTH CARE EDUCATION/TRAINING PROGRAM

## 2018-12-22 PROCEDURE — 20600001 HC STEP DOWN PRIVATE ROOM

## 2018-12-22 RX ADMIN — HYDROMORPHONE HYDROCHLORIDE 1 MG: 1 INJECTION, SOLUTION INTRAMUSCULAR; INTRAVENOUS; SUBCUTANEOUS at 11:12

## 2018-12-22 RX ADMIN — STANDARDIZED SENNA CONCENTRATE AND DOCUSATE SODIUM 1 TABLET: 8.6; 5 TABLET, FILM COATED ORAL at 09:12

## 2018-12-22 RX ADMIN — FAMOTIDINE 20 MG: 20 TABLET ORAL at 09:12

## 2018-12-22 RX ADMIN — HEPARIN SODIUM 5000 UNITS: 5000 INJECTION, SOLUTION INTRAVENOUS; SUBCUTANEOUS at 09:12

## 2018-12-22 RX ADMIN — DEXAMETHASONE SODIUM PHOSPHATE 4 MG: 4 INJECTION, SOLUTION INTRAMUSCULAR; INTRAVENOUS at 12:12

## 2018-12-22 RX ADMIN — HYDROCODONE BITARTRATE AND ACETAMINOPHEN 1 TABLET: 5; 325 TABLET ORAL at 12:12

## 2018-12-22 RX ADMIN — DEXAMETHASONE SODIUM PHOSPHATE 4 MG: 4 INJECTION, SOLUTION INTRAMUSCULAR; INTRAVENOUS at 06:12

## 2018-12-22 RX ADMIN — HYDROCODONE BITARTRATE AND ACETAMINOPHEN 1 TABLET: 5; 325 TABLET ORAL at 09:12

## 2018-12-22 RX ADMIN — HYDROCODONE BITARTRATE AND ACETAMINOPHEN 1 TABLET: 5; 325 TABLET ORAL at 04:12

## 2018-12-22 RX ADMIN — ONDANSETRON 8 MG: 8 TABLET, ORALLY DISINTEGRATING ORAL at 02:12

## 2018-12-22 RX ADMIN — HEPARIN SODIUM 5000 UNITS: 5000 INJECTION, SOLUTION INTRAVENOUS; SUBCUTANEOUS at 02:12

## 2018-12-22 RX ADMIN — HEPARIN SODIUM 5000 UNITS: 5000 INJECTION, SOLUTION INTRAVENOUS; SUBCUTANEOUS at 06:12

## 2018-12-22 RX ADMIN — LEVETIRACETAM 500 MG: 5 INJECTION INTRAVENOUS at 09:12

## 2018-12-22 RX ADMIN — HYDROMORPHONE HYDROCHLORIDE 1 MG: 1 INJECTION, SOLUTION INTRAMUSCULAR; INTRAVENOUS; SUBCUTANEOUS at 09:12

## 2018-12-22 RX ADMIN — HYDROMORPHONE HYDROCHLORIDE 1 MG: 1 INJECTION, SOLUTION INTRAMUSCULAR; INTRAVENOUS; SUBCUTANEOUS at 06:12

## 2018-12-22 RX ADMIN — ALBUTEROL SULFATE 2 PUFF: 90 AEROSOL, METERED RESPIRATORY (INHALATION) at 09:12

## 2018-12-22 RX ADMIN — HYDROMORPHONE HYDROCHLORIDE 1 MG: 1 INJECTION, SOLUTION INTRAMUSCULAR; INTRAVENOUS; SUBCUTANEOUS at 02:12

## 2018-12-22 RX ADMIN — LEVETIRACETAM 500 MG: 5 INJECTION INTRAVENOUS at 10:12

## 2018-12-22 RX ADMIN — CEFTRIAXONE 2 G: 2 INJECTION, SOLUTION INTRAVENOUS at 09:12

## 2018-12-22 RX ADMIN — HYDROCODONE BITARTRATE AND ACETAMINOPHEN 1 TABLET: 5; 325 TABLET ORAL at 07:12

## 2018-12-22 RX ADMIN — MUPIROCIN 1 G: 20 OINTMENT TOPICAL at 09:12

## 2018-12-22 NOTE — PT/OT/SLP EVAL
Occupational Therapy   Evaluation and Discharge Note    Name: Radha Moses  MRN: [id number]  Admitting Diagnosis:  Meningioma 2 Days Post-Op    Recommendations:     Discharge Recommendations: (home)  Discharge Equipment Recommendations:  none  Barriers to discharge:  None    History:     Occupational Profile:  Living Environment: Pt lives with daughter in 1SH with 0STE. Home has tub/shower with shower chair and grab bars  Previous level of function: PTA, pt reports independence with all ADL and IADL. Pt was not using any AD for ambulation or ADLs. Pt works as a   Roles and Routines: mother,   Equipment Used at home:  shower chair, grab bar  Assistance upon Discharge: Pt reports mother lives next door and can assist as needed following d/c     Past Medical History:   Diagnosis Date    Asthma     Brain tumor     Hypertension        Past Surgical History:   Procedure Laterality Date     SECTION             Subjective     Chief Complaint: n/a  Patient/Family Comments/goals: go home    Pain/Comfort:  · Pain Rating 1: 0/10  · Pain Addressed 1: Reposition, Distraction, Cessation of Activity  · Pain Rating Post-Intervention 1: 0/10    Patients cultural, spiritual, Mandaen conflicts given the current situation: no    Objective:     Communicated with: RN prior to session.  Patient found all lines intact and call button in reach and telemetry, hemovac upon OT entry to room.    General Precautions: Standard, fall   Orthopedic Precautions:N/A   Braces: N/A     Occupational Performance:    Bed Mobility:    · Patient completed Scooting/Bridging with supervision  · Patient completed Supine to Sit with modified independence and with side rail    Functional Mobility/Transfers:  · Patient completed Sit <> Stand Transfer with supervision  with  no assistive device   · Functional Mobility: Pt ambulate 150 ft with supervision using no AD    Activities of Daily Living:  · Lower  "Body Dressing: setup assistance to don B socks while seated EOB  · Toileting: Pt reporting independence with toileting in bathroom    Cognitive/Visual Perceptual:  Cognitive/Psychosocial Skills:     -       Oriented to: Person, Place, Time and Situation   -       Follows Commands/attention:Follows multistep  commands  -       Communication: clear/fluent  -       Memory: No Deficits noted  -       Safety awareness/insight to disability: intact   -       Mood/Affect/Coping skills/emotional control: Appropriate to situation  Visual/Perceptual:      -Pt wears glasses; hearing intact    Physical Exam:  Balance:    -       good sitting/standing balance  Postural examination/scapula alignment:    -       No postural abnormalities identified  Skin integrity: Visible skin intact  Edema:  None noted  Sensation:    -       Intact  Dominant hand:    -       R hand  Upper Extremity Range of Motion:     -       Right Upper Extremity: WFL  -       Left Upper Extremity: WFL  Upper Extremity Strength:    -       Right Upper Extremity: WFL  -       Left Upper Extremity: WFL   Strength:    -       Right Upper Extremity: WFL  -       Left Upper Extremity: WFL  Fine Motor Coordination:    -       Intact  Gross motor coordination:   WFL    AMPAC 6 Click ADL:  AMPAC Total Score: 24    Treatment & Education:  Pt educated on role of OT/POC  Pt educated on importance of ambulation/UIC with RN/family  Pt educated on and in agreement with d/c from acute OT, no concerns/questions regarding return home  White board/communication board updated  Education:    Patient left seated EOB with all lines intact and call button in reach    Assessment:     Radha Moses is a 33 y.o. female with a medical diagnosis of Meningioma. At this time, patient is functioning at their prior level of function and does not require further acute OT services.     Clinical Decision Makin.  OT Low:  "Pt evaluation falls under low complexity for " "evaluation coding due to performance deficits noted in 1-3 areas as stated above and 0 co-morbities affecting current functional status. Data obtained from problem focused assessments. No modifications or assistance was required for completion of evaluation. Only brief occupational profile and history review completed."     Plan:     During this hospitalization, patient does not require further acute OT services.  Please re-consult if situation changes.    · Plan of Care Reviewed with: patient    This Plan of care has been discussed with the patient who was involved in its development and understands and is in agreement with the identified goals and treatment plan    GOALS:   Multidisciplinary Problems     Occupational Therapy Goals     Not on file          Multidisciplinary Problems (Resolved)        Problem: Occupational Therapy Goal    Goal Priority Disciplines Outcome Interventions   Occupational Therapy Goal   (Resolved)     OT, PT/OT Outcome(s) achieved                    Time Tracking:     OT Date of Treatment: 12/22/18  OT Start Time: 1107  OT Stop Time: 1113  OT Total Time (min): 6 min    Billable Minutes:Evaluation 6    Debby Kidd OT  12/22/2018    "

## 2018-12-22 NOTE — PLAN OF CARE
Problem: Occupational Therapy Goal  Goal: Occupational Therapy Goal  Outcome: Outcome(s) achieved Date Met: 12/22/18  Eval completed; no acute OT needs    Comments: D/C OT 12/22/2018    Debby Kidd, OT

## 2018-12-22 NOTE — NURSING
Pt c/o severe head and neck pain, dizziness and nausea not relieved by medication. Vitals stable. Drain out put ~150cc. Neuro Sx notified. Stated to give her her Dilaudid and to wait for an hour and if pt does not have any relief or if symptoms get worse, call back and he will come and see the pt. Pt is lying in bed with HOB elevated all the way and heat pack to neck. Will continue to monitor.

## 2018-12-22 NOTE — PLAN OF CARE
Problem: Adult Inpatient Plan of Care  Goal: Plan of Care Review  Outcome: Ongoing (interventions implemented as appropriate)  POC reviewed with pt and family. Pt verbalized understanding. Questions and concerns addressed. No acute events tonight. Pt progressing toward goals. Will continue to monitor. See flowsheets for full assessment and VS info.

## 2018-12-22 NOTE — PROGRESS NOTES
[facility]  Neurosurgery  Progress Note    Subjective:     History of Present Illness: No notes on file    Post-Op Info:  Procedure(s) (LRB):  CRANIOTOMY, USING FRAMELESS STEREOTAXY Right fronto-temporal (Right)   2 Days Post-Op     Interval History: NAEON. POD#2. Drain output 200cc overnight. Vision improved. Tolerating PO. Has not been OOB.    Medications:  Continuous Infusions:  Scheduled Meds:   cefTRIAXone (ROCEPHIN) IVPB  2 g Intravenous Q12H    dexamethasone  4 mg Intravenous Q6H    famotidine  20 mg Oral BID    heparin (porcine)  5,000 Units Subcutaneous Q8H    levetiracetam IVPB  500 mg Intravenous Q12H    mupirocin  1 g Nasal BID    senna-docusate 8.6-50 mg  1 tablet Oral Daily     PRN Meds:acetaminophen, albuterol, HYDROcodone-acetaminophen, HYDROmorphone, metoclopramide HCl, ondansetron     Review of Systems    Objective:     Weight: 85.7 kg (189 lb)  Body mass index is 33.48 kg/m².  Vital Signs (Most Recent):  Temp: 98 °F (36.7 °C) (12/22/18 1120)  Pulse: 88 (12/22/18 1120)  Resp: 18 (12/22/18 1120)  BP: (!) 132/91 (12/22/18 1120)  SpO2: 96 % (12/22/18 1120) Vital Signs (24h Range):  Temp:  [98 °F (36.7 °C)-98.9 °F (37.2 °C)] 98 °F (36.7 °C)  Pulse:  [71-93] 88  Resp:  [14-27] 18  SpO2:  [95 %-100 %] 96 %  BP: (120-140)/(66-91) 132/91  Arterial Line BP: (127-164)/() 149/81                           Closed/Suction Drain 12/20/18 1147 Right Other (Comment) Accordion 10 Fr. (Active)   Site Description Unable to view 12/21/2018  3:00 PM   Dressing Type [place] 12/21/2018  3:00 PM   Dressing Status Clean;Dry;Intact 12/21/2018  3:00 PM   Dressing Intervention Other (Comment) 12/21/2018 11:00 AM   Drainage Bloody 12/21/2018  3:00 PM   Status To bulb suction 12/21/2018  3:00 PM   Output (mL) 30 mL 12/21/2018 12:00 PM       Neurosurgery Physical Exam     Awake, alert, NAD  PERRL  EOMI  VF grossly intact, able to count numbers in all 4 quadrants bilaterally, most difficulty  in R inferior outer quadrant  FS in BUE/BLE  SILT  No drift      Significant Labs:  Recent Labs   Lab 12/20/18  1228 12/21/18  0600   * 118*   * 139   K 4.6 4.5    112*   CO2 18* 17*   BUN 11 12   CREATININE 0.7 0.7   CALCIUM 8.1* 8.5*     Recent Labs   Lab 12/20/18  1228 12/21/18  0600   WBC 14.57* 18.43*   HGB 9.3* 9.0*   HCT 29.7* 28.7*   * 302     No results for input(s): LABPT, INR, APTT in the last 48 hours.  Microbiology Results (last 7 days)     ** No results found for the last 168 hours. **        Recent Lab Results       12/21/18  1321        Allens Test N/A     Site [facility]     DelSys Room Air     Mode SPONT     POC BE -2     POC HCO3 20.9     POC PCO2 27.7     POC PH 7.486     POC PO2 108     POC SATURATED O2 99     POC TCO2 22     Rate 28     Sample ARTERIAL     Sp02 99           Significant Diagnostics:  I have reviewed all pertinent imaging results/findings within the past 24 hours.    Assessment/Plan:     * Meningioma    33 F w/tuberculum sella meningioma s/p resection.    - Doing well post operatively.  - Post operative imaging is satisfactory.  - Drain: 200 cc overnight, bloody. Continue drain and abx.  - Pain control: continue current regimen  - Atelectasis ppx: IS Q2h while awake.  - DVT ppx: TEDs, SCDs, SQH.  - GI: Diet as tolerated. Continue bowel regimen.  - Bacitracin to incision BID.  - PT/OT: Eval pending  - Disposition: Anticipate DC tomorrow vs Monday pending drain output.           Laila Byrd PA-C  Neurosurgery  [facility]

## 2018-12-22 NOTE — SUBJECTIVE & OBJECTIVE
Interval History: NAEON. POD#2. Drain output 200cc overnight. Vision improved. Tolerating PO. Has not been OOB.    Medications:  Continuous Infusions:  Scheduled Meds:   cefTRIAXone (ROCEPHIN) IVPB  2 g Intravenous Q12H    dexamethasone  4 mg Intravenous Q6H    famotidine  20 mg Oral BID    heparin (porcine)  5,000 Units Subcutaneous Q8H    levetiracetam IVPB  500 mg Intravenous Q12H    mupirocin  1 g Nasal BID    senna-docusate 8.6-50 mg  1 tablet Oral Daily     PRN Meds:acetaminophen, albuterol, HYDROcodone-acetaminophen, HYDROmorphone, metoclopramide HCl, ondansetron     Review of Systems    Objective:     Weight: 85.7 kg (189 lb)  Body mass index is 33.48 kg/m².  Vital Signs (Most Recent):  Temp: 98 °F (36.7 °C) (12/22/18 1120)  Pulse: 88 (12/22/18 1120)  Resp: 18 (12/22/18 1120)  BP: (!) 132/91 (12/22/18 1120)  SpO2: 96 % (12/22/18 1120) Vital Signs (24h Range):  Temp:  [98 °F (36.7 °C)-98.9 °F (37.2 °C)] 98 °F (36.7 °C)  Pulse:  [71-93] 88  Resp:  [14-27] 18  SpO2:  [95 %-100 %] 96 %  BP: (120-140)/(66-91) 132/91  Arterial Line BP: (127-164)/() 149/81                           Closed/Suction Drain 12/20/18 1147 Right Other (Comment) Accordion 10 Fr. (Active)   Site Description Unable to view 12/21/2018  3:00 PM   Dressing Type [facility] 12/21/2018  3:00 PM   Dressing Status Clean;Dry;Intact 12/21/2018  3:00 PM   Dressing Intervention Other (Comment) 12/21/2018 11:00 AM   Drainage Bloody 12/21/2018  3:00 PM   Status To bulb suction 12/21/2018  3:00 PM   Output (mL) 30 mL 12/21/2018 12:00 PM       Neurosurgery Physical Exam     Awake, alert, NAD  PERRL  EOMI  VF grossly intact, able to count numbers in all 4 quadrants bilaterally, most difficulty in R inferior outer quadrant  FS in BUE/BLE  SILT  No drift      Significant Labs:  Recent Labs   Lab 12/20/18  1228 12/21/18  0600   * 118*   * 139   K 4.6 4.5    112*   CO2 18* 17*   BUN 11 12   CREATININE 0.7 0.7   CALCIUM 8.1* 8.5*      Recent Labs   Lab 12/20/18  1228 12/21/18  0600   WBC 14.57* 18.43*   HGB 9.3* 9.0*   HCT 29.7* 28.7*   * 302     No results for input(s): LABPT, INR, APTT in the last 48 hours.  Microbiology Results (last 7 days)     ** No results found for the last 168 hours. **        Recent Lab Results       12/21/18  1321        Allens Test N/A     Site [place] Room Air     Mode SPONT     POC BE -2     POC HCO3 20.9     POC PCO2 27.7     POC PH 7.486     POC PO2 108     POC SATURATED O2 99     POC TCO2 22     Rate 28     Sample ARTERIAL     Sp02 99           Significant Diagnostics:  I have reviewed all pertinent imaging results/findings within the past 24 hours.

## 2018-12-22 NOTE — ASSESSMENT & PLAN NOTE
33 F w/tuberculum sella meningioma s/p resection.    - Doing well post operatively.  - Post operative imaging is satisfactory.  - Drain: 200 cc overnight, bloody. Continue drain and abx.  - Pain control: continue current regimen  - Atelectasis ppx: IS Q2h while awake.  - DVT ppx: TEDs, SCDs, SQH.  - GI: Diet as tolerated. Continue bowel regimen.  - Bacitracin to incision BID.  - PT/OT: Eval pending  - Disposition: Anticipate DC tomorrow vs Monday pending drain output.

## 2018-12-23 VITALS
HEART RATE: 75 BPM | BODY MASS INDEX: 33.49 KG/M2 | WEIGHT: 189 LBS | OXYGEN SATURATION: 98 % | RESPIRATION RATE: 16 BRPM | DIASTOLIC BLOOD PRESSURE: 67 MMHG | HEIGHT: 63 IN | SYSTOLIC BLOOD PRESSURE: 118 MMHG | TEMPERATURE: 98 F

## 2018-12-23 PROCEDURE — 25000003 PHARM REV CODE 250: Performed by: STUDENT IN AN ORGANIZED HEALTH CARE EDUCATION/TRAINING PROGRAM

## 2018-12-23 PROCEDURE — 97161 PT EVAL LOW COMPLEX 20 MIN: CPT

## 2018-12-23 PROCEDURE — 25000003 PHARM REV CODE 250: Performed by: NURSE PRACTITIONER

## 2018-12-23 PROCEDURE — 63600175 PHARM REV CODE 636 W HCPCS: Performed by: STUDENT IN AN ORGANIZED HEALTH CARE EDUCATION/TRAINING PROGRAM

## 2018-12-23 RX ORDER — DEXAMETHASONE 1 MG/1
3 TABLET ORAL EVERY 8 HOURS
Status: DISCONTINUED | OUTPATIENT
Start: 2018-12-25 | End: 2018-12-24 | Stop reason: HOSPADM

## 2018-12-23 RX ORDER — HYDROCODONE BITARTRATE AND ACETAMINOPHEN 5; 325 MG/1; MG/1
1 TABLET ORAL EVERY 4 HOURS PRN
Qty: 20 TABLET | Refills: 0 | Status: SHIPPED | OUTPATIENT
Start: 2018-12-23 | End: 2019-01-08 | Stop reason: SDUPTHER

## 2018-12-23 RX ORDER — DEXAMETHASONE 1.5 MG/1
3 TABLET ORAL EVERY 8 HOURS
Qty: 60 TABLET | Refills: 0 | Status: SHIPPED | OUTPATIENT
Start: 2018-12-25 | End: 2019-01-04

## 2018-12-23 RX ORDER — DEXAMETHASONE 1 MG/1
2 TABLET ORAL DAILY
Status: DISCONTINUED | OUTPATIENT
Start: 2018-12-31 | End: 2018-12-24 | Stop reason: HOSPADM

## 2018-12-23 RX ORDER — DEXAMETHASONE 1 MG/1
3 TABLET ORAL EVERY 12 HOURS
Status: DISCONTINUED | OUTPATIENT
Start: 2018-12-27 | End: 2018-12-24 | Stop reason: HOSPADM

## 2018-12-23 RX ORDER — DEXAMETHASONE 4 MG/1
4 TABLET ORAL EVERY 8 HOURS
Status: DISCONTINUED | OUTPATIENT
Start: 2018-12-23 | End: 2018-12-24 | Stop reason: HOSPADM

## 2018-12-23 RX ORDER — DEXAMETHASONE 1 MG/1
2 TABLET ORAL EVERY 12 HOURS
Status: DISCONTINUED | OUTPATIENT
Start: 2018-12-29 | End: 2018-12-24 | Stop reason: HOSPADM

## 2018-12-23 RX ORDER — DEXAMETHASONE 4 MG/1
4 TABLET ORAL SEE ADMIN INSTRUCTIONS
Qty: 60 TABLET | Refills: 0 | Status: SHIPPED | OUTPATIENT
Start: 2018-12-23 | End: 2019-01-02

## 2018-12-23 RX ORDER — DEXAMETHASONE 1.5 MG/1
3 TABLET ORAL EVERY 12 HOURS
Qty: 40 TABLET | Refills: 0 | Status: SHIPPED | OUTPATIENT
Start: 2018-12-27 | End: 2019-01-06

## 2018-12-23 RX ORDER — DEXAMETHASONE 4 MG/1
4 TABLET ORAL SEE ADMIN INSTRUCTIONS
Qty: 60 TABLET | Refills: 0 | Status: SHIPPED | OUTPATIENT
Start: 2018-12-23 | End: 2018-12-23

## 2018-12-23 RX ADMIN — LEVETIRACETAM 500 MG: 5 INJECTION INTRAVENOUS at 09:12

## 2018-12-23 RX ADMIN — HEPARIN SODIUM 5000 UNITS: 5000 INJECTION, SOLUTION INTRAVENOUS; SUBCUTANEOUS at 06:12

## 2018-12-23 RX ADMIN — FAMOTIDINE 20 MG: 20 TABLET ORAL at 09:12

## 2018-12-23 RX ADMIN — CEFTRIAXONE 2 G: 2 INJECTION, SOLUTION INTRAVENOUS at 09:12

## 2018-12-23 RX ADMIN — HYDROCODONE BITARTRATE AND ACETAMINOPHEN 1 TABLET: 5; 325 TABLET ORAL at 12:12

## 2018-12-23 RX ADMIN — HYDROCODONE BITARTRATE AND ACETAMINOPHEN 1 TABLET: 5; 325 TABLET ORAL at 02:12

## 2018-12-23 RX ADMIN — LEVETIRACETAM 500 MG: 5 INJECTION INTRAVENOUS at 08:12

## 2018-12-23 RX ADMIN — DEXAMETHASONE SODIUM PHOSPHATE 4 MG: 4 INJECTION, SOLUTION INTRAMUSCULAR; INTRAVENOUS at 06:12

## 2018-12-23 RX ADMIN — MUPIROCIN 1 G: 20 OINTMENT TOPICAL at 09:12

## 2018-12-23 RX ADMIN — HEPARIN SODIUM 5000 UNITS: 5000 INJECTION, SOLUTION INTRAVENOUS; SUBCUTANEOUS at 09:12

## 2018-12-23 RX ADMIN — DEXAMETHASONE 4 MG: 4 TABLET ORAL at 02:12

## 2018-12-23 RX ADMIN — DEXAMETHASONE SODIUM PHOSPHATE 4 MG: 4 INJECTION, SOLUTION INTRAMUSCULAR; INTRAVENOUS at 12:12

## 2018-12-23 RX ADMIN — HYDROCODONE BITARTRATE AND ACETAMINOPHEN 1 TABLET: 5; 325 TABLET ORAL at 06:12

## 2018-12-23 RX ADMIN — STANDARDIZED SENNA CONCENTRATE AND DOCUSATE SODIUM 1 TABLET: 8.6; 5 TABLET, FILM COATED ORAL at 09:12

## 2018-12-23 RX ADMIN — DEXAMETHASONE 4 MG: 4 TABLET ORAL at 09:12

## 2018-12-23 RX ADMIN — MUPIROCIN 1 G: 20 OINTMENT TOPICAL at 06:12

## 2018-12-23 RX ADMIN — MUPIROCIN 1 G: 20 OINTMENT TOPICAL at 08:12

## 2018-12-23 RX ADMIN — FAMOTIDINE 20 MG: 20 TABLET ORAL at 08:12

## 2018-12-23 NOTE — NURSING
Pt ready for d/c. Pt in need transportation Unable to reach Case Management after several attempts. Spoke with house supervisor regarding transportation situation. Placed call to Transfer center obtained steps to take to get patient transferred. Placed call to MD on call to attempt to inform still awaiting call back. Awaiting resolution from house supervisor will await call back.

## 2018-12-23 NOTE — PT/OT/SLP EVAL
"Physical Therapy Evaluation/D/C Summary    Patient Name:  Radha Moses   MRN:  [id number]    Recommendations:     Discharge Recommendations:  (home)   Discharge Equipment Recommendations: none   Barriers to discharge: None    Assessment:     Radha Moses is a 33 y.o. female admitted with a medical diagnosis of Meningioma.  She presents with the following impairments/functional limitations:    weakness and fatigue. PT D/Alber due to pt able to ambulate in halls and go up/down steps. Pt and her family educated in the importance of ambulating in the halls 3xs/day.    Recent Surgery: Procedure(s) (LRB):  CRANIOTOMY, USING FRAMELESS STEREOTAXY Right fronto-temporal (Right) 3 Days Post-Op    Plan:     (D/C PT due to pt able to ambulate in halls and go up/down steps )   · Plan of Care Expires:  01/22/19    Subjective   "I will walk and then eat after"  Pain/Comfort:  · Pain Rating 1: 0/10  · Pain Rating Post-Intervention 1: 0/10    Patients cultural, spiritual, Moravian conflicts given the current situation: no    Living Environment:  Pt lives with her daughter in a 1 story home with no DAVID  Prior to admission, patients level of function was independent.  Equipment used at home: none. Upon discharge, patient will have assistance from daughter.    Objective:     Communicated with nurse prior to session.  Patient found all lines intact, call button in reach, nurse notified and family present telemetry  upon PT entry to room.    General Precautions: Standard, fall   Orthopedic Precautions:N/A   Braces: N/A     Exams:  · Cognitive Exam:  Patient is oriented to Person, Place, Time and Situation  · Sensation:    · -       Intact  light/touch B LE  · RLE ROM: WFL  · RLE Strength: WFL  · LLE ROM: WFL  · LLE Strength: WFL except hip flex 4-/5    Functional Mobility:  · Bed Mobility:     · Supine to Sit: supervision  · Sit to Supine: supervision  · Transfers:     · Sit to Stand:  supervision with no AD  · Gait: " 120ft with no AD with supervision; pt performed standing balance activities with no instability noted: high knee gait, ambulated backwards, and single leg stance.  · Stairs:  Pt ascended/descended 6 stair(s) with No Assistive Device with right handrail with Supervision or Set-up Assistance.     AM-PAC 6 CLICK MOBILITY  Total Score:23     Patient left supine with all lines intact, call button in reach, nurse notified and family present.    GOALS:   Multidisciplinary Problems     Physical Therapy Goals     Not on file          Multidisciplinary Problems (Resolved)        Problem: Physical Therapy Goal    Goal Priority Disciplines Outcome Goal Variances Interventions   Physical Therapy Goal   (Resolved)     PT, PT/OT Outcome(s) achieved                     History:     Past Medical History:   Diagnosis Date    Asthma     Brain tumor     Hypertension        Past Surgical History:   Procedure Laterality Date     SECTION             Clinical Decision Making:     History  Co-morbidities and personal factors that may impact the plan of care Examination  Body Structures and Functions, activity limitations and participation restrictions that may impact the plan of care Clinical Presentation   Decision Making/ Complexity Score   Co-morbidities:   [] Time since onset of injury / illness / exacerbation  [] Status of current condition  []Patient's cognitive status and safety concerns    [] Multiple Medical Problems (see med hx)  Personal Factors:   [] Patient's age  [] Prior Level of function   [] Patient's home situation (environment and family support)  [] Patient's level of motivation  [] Expected progression of patient      HISTORY:(criteria)    [x] 65881 - no personal factors/history    [] 81245 - has 1-2 personal factor/comorbidity     [] 83556 - has >3 personal factor/comorbidity     Body Regions:  [] Objective examination findings  [] Head     []  Neck  [] Trunk   [] Upper Extremity  [] Lower  Extremity    Body Systems:  [] For communication ability, affect, cognition, language, and learning style: the assessment of the ability to make needs known, consciousness, orientation (person, place, and time), expected emotional /behavioral responses, and learning preferences (eg, learning barriers, education  needs)  [] For the neuromuscular system: a general assessment of gross coordinated movement (eg, balance, gait, locomotion, transfers, and transitions) and motor function  (motor control and motor learning)  [] For the musculoskeletal system: the assessment of gross symmetry, gross range of motion, gross strength, height, and weight  [] For the integumentary system: the assessment of pliability(texture), presence of scar formation, skin color, and skin integrity  [] For cardiovascular/pulmonary system: the assessment of heart rate, respiratory rate, blood pressure, and edema     Activity limitations:    [] Patient's cognitive status and saf ety concerns          [] Status of current condition      [] Weight bearing restriction  [] Cardiopulmunary Restriction    Participation Restrictions:   [] Goals and goal agreement with the patient     [] Rehab potential (prognosis) and probable outcome      Examination of Body System: (criteria)    [] 68567 - addressing 1-2 elements    [] 50773 - addressing a total of 3 or more elements     [] 60718 -  Addressing a total of 4 or more elements         Clinical Presentation: (criteria)  Stable - 82003     On examination of body system using standardized tests and measures patient presents with (CHOOSE ONE) elements from any of the following: body structures and functions, activity limitations, and/or participation restrictions.  Leading to a clinical presentation that is considered (CHOOSE ONE)                              Clinical Decision Making  (Eval Complexity):  Low- 20033     Time Tracking:     PT Received On: 12/23/18  PT Start Time: 0835     PT Stop Time: 0850  PT  Total Time (min): 15 min     Billable Minutes: Evaluation 15      Samanta Villarreal, PT  12/23/2018

## 2018-12-23 NOTE — PLAN OF CARE
Problem: Physical Therapy Goal  Goal: Physical Therapy Goal  Outcome: Outcome(s) achieved Date Met: 12/23/18  PT D/Alber due to pt able to ambulate in halls and go up/down steps as needed upon D/C. Pt and her family educated to ambulate in halls 3xs/day. Samanta Villarreal PT 12/23/18

## 2018-12-23 NOTE — DISCHARGE SUMMARY
[facility]  Neurosurgery  Discharge Summary      Patient Name: Radha Moses  MRN: [id number]  Admission Date: 12/20/2018  Hospital Length of Stay: 3 days  Discharge Date and Time:  12/23/2018 8:44 AM  Attending Physician: Oscar Linda MD   Discharging Provider: Kyle Green MD  Primary Care Provider: LYNN Guerrero MD    HPI:   No notes on file    Procedure(s) (LRB):  CRANIOTOMY, USING FRAMELESS STEREOTAXY Right fronto-temporal (Right)     Hospital Course: 12/22/2018 POD#2. Drain output 200cc overnight.     Consults:     Significant Diagnostic Studies: Labs: All labs within the past 24 hours have been reviewed    Pending Diagnostic Studies:     None        Final Active Diagnoses:    Diagnosis Date Noted POA    PRINCIPAL PROBLEM:  Meningioma [D32.9] 11/27/2018 Yes    Mass [R22.9] 12/20/2018 Yes    Mild intermittent asthma without complication [J45.20] 12/04/2018 Yes    Essential hypertension [I10] 12/04/2018 Yes    Anemia [D64.9] 12/04/2018 Yes    Class 2 obesity with body mass index (BMI) of 38.0 to 38.9 in adult [E66.9, Z68.38] 12/04/2018 Not Applicable    Cerebral edema [G93.6]  Yes    Blurry vision [H53.8]  Yes      Problems Resolved During this Admission:      Discharged Condition: good    Disposition: Home or Self Care    Follow Up:    Patient Instructions:      Diet Adult Regular     Notify your health care provider if you experience any of the following:  temperature >100.4     Notify your health care provider if you experience any of the following:  persistent nausea and vomiting or diarrhea     Notify your health care provider if you experience any of the following:  severe uncontrolled pain     Notify your health care provider if you experience any of the following:  redness, tenderness, or signs of infection (pain, swelling, redness, odor or green/yellow discharge around incision site)     Notify your health care provider if you experience any of the  following:  difficulty breathing or increased cough     Notify your health care provider if you experience any of the following:  severe persistent headache     Notify your health care provider if you experience any of the following:  worsening rash     Notify your health care provider if you experience any of the following:  persistent dizziness, light-headedness, or visual disturbances     Notify your health care provider if you experience any of the following:  increased confusion or weakness     Activity as tolerated     Medications:  Reconciled Home Medications:      Medication List      START taking these medications    HYDROcodone-acetaminophen 5-325 mg per tablet  Commonly known as:  NORCO  Take 1 tablet by mouth every 4 (four) hours as needed for Pain (7-10).        CHANGE how you take these medications    * dexamethasone 4 MG Tab  Commonly known as:  DECADRON  Take 1 tablet (4 mg total) by mouth As instructed (12/23 and 24: Take one tab (4mg) three times daily. 12/25 and 26: Take 1.5 tabs (3mg) three times daily. 12/27: Take 1.5 tabs (3mg) twice daily.12/28: Take 1.5 tabs (3mg) twice daily.12/29: Take 0.5 tabs (2mg) twice daily.12/30: Take 0.5 tabs (2mg) twice daily. 12/31: Take 0.5 tabs (2mg) once daily. 1/1,2, and 3: Take 0.5 tabs (2mg) once daily.).  What changed:  You were already taking a medication with the same name, and this prescription was added. Make sure you understand how and when to take each.     * dexamethasone 1.5 MG tablet  Commonly known as:  DECADRON  Take 2 tablets (3 mg total) by mouth every 8 (eight) hours. for 10 days  Start taking on:  12/25/2018  What changed:    · medication strength  · how much to take  · how to take this  · when to take this  · additional instructions  · These instructions start on 12/25/2018. If you are unsure what to do until then, ask your doctor or other care provider.     * dexamethasone 1.5 MG tablet  Commonly known as:  DECADRON  Take 2 tablets (3 mg  total) by mouth every 12 (twelve) hours. for 10 days  Start taking on:  12/27/2018  What changed:  You were already taking a medication with the same name, and this prescription was added. Make sure you understand how and when to take each.         * This list has 3 medication(s) that are the same as other medications prescribed for you. Read the directions carefully, and ask your doctor or other care provider to review them with you.            CONTINUE taking these medications    albuterol 90 mcg/actuation inhaler  Commonly known as:  PROVENTIL/VENTOLIN HFA  Inhale 1-2 puffs into the lungs every 6 (six) hours as needed for Wheezing or Shortness of Breath. Rescue     famotidine 20 MG tablet  Commonly known as:  PEPCID  Take 1 tablet (20 mg total) by mouth 2 (two) times daily.     UNABLE TO FIND  medication name: Depo Medroxy Progesterone            Kyle Green MD  Neurosurgery  [facility]

## 2018-12-23 NOTE — DISCHARGE INSTRUCTIONS
Please follow ONLY the instructions that are checked below.    Activity Restrictions:  [x]  Return to work will be determined on an individual basis.  [x]  No lifting greater than 10 pounds.  [x]  No driving or operating machinery:  [x]  until cleared by your surgeon.  [x]  while taking narcotic pain medications or muscle relaxants.  [x]  Increase ambulation over the next 2 weeks so that you are walking 2 miles per day at 2 weeks post-operatively.  [x]  Walk on paved surfaces only. It is okay to walk up and down stairs while holding onto a side rail.  [x]  No sexual activity for 2-3 weeks.    Discharge Medication/Follow-up:  [x]  Please refer to discharge medication reconciliation form.  [x]  Do not take ANY non-steroidal anti-inflammatory drugs (NSAIDS), including the following: ibuprofen, naprosyn, Aleve, Advil, Indocin, Mobic, or Celebrex for:  [x]  8 weeks  [x]  Prescriptions for appropriate medication will be given upon discharge.  [x]  Take docusate (Colace 100 mg): take one capsule a day as needed for constipation. You can get this over the counter.  []  Follow-up appointment:  [x]  10-14 days post-op for wound check by physician assistant/nurse  [x]  with CT / MRI  [x]  An appointment will be mailed to you.    Wound Care:  [x]  No bandage required. Keep your incision open to the air.  [x]  You may shower on the 2nd day after your surgery. Have the force of water hit you opposite from the incision. Pat the incision dry after your shower; do not scrub the incision.  [x]  You cannot take a bath until 8 weeks after surgery.      Call your doctor or go to the Emergency Room for any signs of infection, including: increased redness, drainage, pain, or fever (temperature ?101.5 for 24 hours). Call your doctor or go to the Emergency Room if there are any localized neurological changes; problems with speech, vision, numbness, tingling, weakness, or severe headache; or for other concerns.    Special Instructions:  [x]   No use of tobacco products.  [x]  Diet: Please eat a regular diet as tolerated.    Physicians need 3 days' notice for pain medicine to be refilled. Pain medicine will only be refilled between 8 AM and 5 PM, Monday through Friday, due to Food and Drug Administration regulation of documentation.    If you have any questions about this form, please call [phone].    Form No. 34693 (Revised 10/31/2013)

## 2018-12-24 ENCOUNTER — TELEPHONE (OUTPATIENT)
Dept: NEUROSURGERY | Facility: CLINIC | Age: 33
End: 2018-12-24

## 2018-12-24 LAB
BLD PROD TYP BPU: NORMAL
BLD PROD TYP BPU: NORMAL
BLOOD UNIT EXPIRATION DATE: NORMAL
BLOOD UNIT EXPIRATION DATE: NORMAL
BLOOD UNIT TYPE CODE: 6200
BLOOD UNIT TYPE CODE: 6200
BLOOD UNIT TYPE: NORMAL
BLOOD UNIT TYPE: NORMAL
CODING SYSTEM: NORMAL
CODING SYSTEM: NORMAL
DISPENSE STATUS: NORMAL
DISPENSE STATUS: NORMAL
TRANS ERYTHROCYTES VOL PATIENT: NORMAL ML
TRANS ERYTHROCYTES VOL PATIENT: NORMAL ML

## 2018-12-24 NOTE — NURSING
Transportation order placed by ANA at 20:18 patient and family notified. Scheduled meds for 21:00 and 22:00 administered and IVs to bilateral hands d'светлана and covered with gauze and coban; patient tolerated well. Discharged reviewed again with patient and family. Instructed patient to keep incision clean and dry, verbalized understanding.    22:15 - Transport arrived and patient transferred to wheelchair. All questions answered.

## 2018-12-24 NOTE — TELEPHONE ENCOUNTER
Called Doug at Pharmacy back. Discussed taper pt was sent home with. Clarified taper. He will contact pt.     ----- Message from Willie Bazan sent at 12/24/2018 11:23 AM CST -----  Contact: Pharmacy @ [phone]  Caller requesting a return call regarding clarity on the direction for (dexamethasone (DECADRON) 4 MG Tab )     [facility] [address] [place] [address] LILLY MACIEL [facility] AT [facility] OF [place] [address] [place] [address] [id number]  Phone: [phone] Fax: [phone]

## 2019-01-03 ENCOUNTER — TELEPHONE (OUTPATIENT)
Dept: NEUROSURGERY | Facility: CLINIC | Age: 34
End: 2019-01-03

## 2019-01-03 NOTE — TELEPHONE ENCOUNTER
I returned the pt call and informed her that she should continue taking the Keppra for 10days by the time of her appt with  the pt should be done with the medication.  ----- Message from Ashley Molina sent at 1/3/2019 11:22 AM CST -----  Contact: self @ [phone]  Pt would like to speak with someone concerning her prescription for dexamethasone (DECADRON) 4 MG Tab.  Would like to know if she is suppose to stop taking it.  Pls call.

## 2019-01-08 ENCOUNTER — OFFICE VISIT (OUTPATIENT)
Dept: NEUROSURGERY | Facility: CLINIC | Age: 34
End: 2019-01-08
Payer: MEDICAID

## 2019-01-08 VITALS
SYSTOLIC BLOOD PRESSURE: 137 MMHG | DIASTOLIC BLOOD PRESSURE: 90 MMHG | HEIGHT: 63 IN | TEMPERATURE: 99 F | WEIGHT: 224.88 LBS | HEART RATE: 96 BPM | BODY MASS INDEX: 39.84 KG/M2

## 2019-01-08 DIAGNOSIS — D32.9 MENINGIOMA: Primary | ICD-10-CM

## 2019-01-08 DIAGNOSIS — R26.89 IMBALANCE: Primary | ICD-10-CM

## 2019-01-08 DIAGNOSIS — D32.9 MENINGIOMA: ICD-10-CM

## 2019-01-08 PROCEDURE — 99999 PR PBB SHADOW E&M-EST. PATIENT-LVL V: ICD-10-PCS | Mod: PBBFAC,,, | Performed by: NEUROLOGICAL SURGERY

## 2019-01-08 PROCEDURE — 99999 PR PBB SHADOW E&M-EST. PATIENT-LVL V: CPT | Mod: PBBFAC,,, | Performed by: NEUROLOGICAL SURGERY

## 2019-01-08 PROCEDURE — 99215 OFFICE O/P EST HI 40 MIN: CPT | Mod: PBBFAC | Performed by: NEUROLOGICAL SURGERY

## 2019-01-08 PROCEDURE — 99024 PR POST-OP FOLLOW-UP VISIT: ICD-10-PCS | Mod: ,,, | Performed by: NEUROLOGICAL SURGERY

## 2019-01-08 PROCEDURE — 99024 POSTOP FOLLOW-UP VISIT: CPT | Mod: ,,, | Performed by: NEUROLOGICAL SURGERY

## 2019-01-08 RX ORDER — DEXAMETHASONE 4 MG/1
4 TABLET ORAL EVERY 12 HOURS
COMMUNITY
End: 2019-01-08

## 2019-01-08 RX ORDER — HYDROCODONE BITARTRATE AND ACETAMINOPHEN 5; 325 MG/1; MG/1
1 TABLET ORAL EVERY 4 HOURS PRN
Qty: 30 TABLET | Refills: 0 | Status: SHIPPED | OUTPATIENT
Start: 2019-01-08 | End: 2019-07-09

## 2019-01-08 RX ORDER — MEDROXYPROGESTERONE ACETATE 150 MG/ML
INJECTION, SUSPENSION INTRAMUSCULAR
COMMUNITY
Start: 2018-12-17

## 2019-01-08 RX ORDER — HYDROCODONE BITARTRATE AND ACETAMINOPHEN 5; 325 MG/1; MG/1
1 TABLET ORAL EVERY 4 HOURS PRN
Qty: 30 TABLET | Refills: 0 | Status: SHIPPED | OUTPATIENT
Start: 2019-01-08 | End: 2019-01-08 | Stop reason: SDUPTHER

## 2019-01-08 NOTE — PROGRESS NOTES
Subjective:   I, Black Whelan, attest that this documentation has been prepared under the direction and in the presence of Oscar Linda MD.     Patient ID: Radha Moses is a 33 y.o. female     Chief Complaint: Post-op Evaluation      HPI  Ms. Radha Moses is a pleasant 33 y.o. woman who is s/p Right frontotemporal craniotomy (on 12/20/2018) with anterior skull base approach for resection of tuberculum sella/diaphragma sella meningioma with decompression  of the optic nerves bilaterally. She is here for her postoperative follow up. The pt was in her normal states of heatlh until     She was discharged on Dexamethasone   Today, pt states she has been particpating in daily home actvities but has fallen twice recently. SheShe works as a      Review of Systems   Constitutional: Negative for activity change, fatigue, fever and unexpected weight change.   HENT: Negative for facial swelling.    Eyes: Negative.    Respiratory: Negative.    Cardiovascular: Negative.    Gastrointestinal: Negative for diarrhea, nausea and vomiting.   Genitourinary: Negative.    Musculoskeletal: Negative for back pain, joint swelling, myalgias and neck pain.   Neurological: Negative for dizziness, weakness, numbness and headaches.   Psychiatric/Behavioral: Negative.       Past Medical History:   Diagnosis Date    Asthma     Brain tumor     Hypertension        Objective:      Vitals:    01/08/19 1338   BP: (!) 137/90   Pulse: 96   Temp:       Physical Exam       IMAGING:      I have personally reviewed the images with the pt.      I, Dr. Oscar Linda, personally performed the services described in this documentation. All medical record entries made by the scribe, Black Whelan, were at my direction and in my presence.  I have reviewed the chart and agree that the record reflects my personal performance and is accurate and complete. Oscar Linda MD. 01/08/2019    Assessment:       1. Imbalance    2.  Meningioma         Plan:   I have personally reviewed the MRI with the pt which shows expected postoperative changes from right frontotemporal craniotomy for tumor for resection of tuberculum sella/diaphragma sella meningioma with some expected residuum within the pituitary gland.    Pathology: benign meningioma.     Pt referred to physical therapy for improvement of her strength. She was provided with an external referral.  Pt provided with a prescription for Norco.    She was instructed to contact us when she feels ready to return to work.    Pt instructed to discontinue the Dexamethasone (Decadron) and to     I will schedule the patient for 6 month follow up with MRI brain.
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
## 2019-01-08 NOTE — PATIENT INSTRUCTIONS
I have personally reviewed the MRI with the pt which shows expected postoperative changes from right frontotemporal craniotomy for tumor for resection of tuberculum sella/diaphragma sella meningioma with some expected residuum within the pituitary gland.    Pathology: benign meningioma.     Pt referred to physical therapy for improvement of her strength. She was provided with an external referral.  Pt provided with a prescription for Norco for pain control.    She was instructed to contact us when she feels ready to return to work.    Pt instructed to discontinue the Dexamethasone (Decadron).    I will schedule the patient for 6 month follow up with MRI brain.

## 2019-01-20 ENCOUNTER — PATIENT MESSAGE (OUTPATIENT)
Dept: NEUROSURGERY | Facility: CLINIC | Age: 34
End: 2019-01-20

## 2019-01-21 ENCOUNTER — TELEPHONE (OUTPATIENT)
Dept: NEUROSURGERY | Facility: CLINIC | Age: 34
End: 2019-01-21

## 2019-01-21 NOTE — TELEPHONE ENCOUNTER
Pt called back. Sores are on her leg. Advised to see PCP. Pt v/u.    Gave permission to wash hair. Advised to  Rinse, pat dry lv open to air.    H/A are not bad. Advised they will improve with time.    Ready to RTW. Asked that letter be faxed to [phone]. Will mail hard copy to her as well.

## 2019-01-23 ENCOUNTER — PATIENT MESSAGE (OUTPATIENT)
Dept: NEUROSURGERY | Facility: CLINIC | Age: 34
End: 2019-01-23

## 2019-01-24 ENCOUNTER — PATIENT MESSAGE (OUTPATIENT)
Dept: NEUROSURGERY | Facility: CLINIC | Age: 34
End: 2019-01-24

## 2019-07-09 ENCOUNTER — OFFICE VISIT (OUTPATIENT)
Dept: NEUROSURGERY | Facility: CLINIC | Age: 34
End: 2019-07-09
Payer: MEDICAID

## 2019-07-09 ENCOUNTER — HOSPITAL ENCOUNTER (OUTPATIENT)
Dept: RADIOLOGY | Facility: HOSPITAL | Age: 34
Discharge: HOME | End: 2019-07-09
Attending: NEUROLOGICAL SURGERY
Payer: MEDICAID

## 2019-07-09 VITALS
TEMPERATURE: 98 F | BODY MASS INDEX: 37.02 KG/M2 | HEART RATE: 96 BPM | SYSTOLIC BLOOD PRESSURE: 129 MMHG | WEIGHT: 209 LBS | DIASTOLIC BLOOD PRESSURE: 84 MMHG

## 2019-07-09 DIAGNOSIS — D32.9 MENINGIOMA: ICD-10-CM

## 2019-07-09 DIAGNOSIS — D32.9 MENINGIOMA: Primary | ICD-10-CM

## 2019-07-09 PROCEDURE — 99213 OFFICE O/P EST LOW 20 MIN: CPT | Mod: PBBFAC,25 | Performed by: NEUROLOGICAL SURGERY

## 2019-07-09 PROCEDURE — 70553 MRI BRAIN STEM W/O & W/DYE: CPT | Mod: 26,,, | Performed by: RADIOLOGY

## 2019-07-09 PROCEDURE — 99999 PR PBB SHADOW E&M-EST. PATIENT-LVL III: CPT | Mod: PBBFAC,,, | Performed by: NEUROLOGICAL SURGERY

## 2019-07-09 PROCEDURE — 99214 OFFICE O/P EST MOD 30 MIN: CPT | Mod: S$PBB,,, | Performed by: NEUROLOGICAL SURGERY

## 2019-07-09 PROCEDURE — 70553 MRI BRAIN W WO CONTRAST: ICD-10-PCS | Mod: 26,,, | Performed by: RADIOLOGY

## 2019-07-09 PROCEDURE — A9585 GADOBUTROL INJECTION: HCPCS | Performed by: NEUROLOGICAL SURGERY

## 2019-07-09 PROCEDURE — 99214 PR OFFICE/OUTPT VISIT, EST, LEVL IV, 30-39 MIN: ICD-10-PCS | Mod: S$PBB,,, | Performed by: NEUROLOGICAL SURGERY

## 2019-07-09 PROCEDURE — 25500020 PHARM REV CODE 255: Performed by: NEUROLOGICAL SURGERY

## 2019-07-09 PROCEDURE — 99999 PR PBB SHADOW E&M-EST. PATIENT-LVL III: ICD-10-PCS | Mod: PBBFAC,,, | Performed by: NEUROLOGICAL SURGERY

## 2019-07-09 PROCEDURE — 70553 MRI BRAIN STEM W/O & W/DYE: CPT | Mod: TC

## 2019-07-09 RX ORDER — ATORVASTATIN CALCIUM 10 MG/1
TABLET, FILM COATED ORAL
Refills: 0 | COMMUNITY
Start: 2019-07-02

## 2019-07-09 RX ORDER — GADOBUTROL 604.72 MG/ML
10 INJECTION INTRAVENOUS
Status: COMPLETED | OUTPATIENT
Start: 2019-07-09 | End: 2019-07-09

## 2019-07-09 RX ORDER — TOPIRAMATE 25 MG/1
TABLET ORAL
COMMUNITY
Start: 2019-06-18

## 2019-07-09 RX ORDER — AMLODIPINE BESYLATE 5 MG/1
TABLET ORAL
COMMUNITY

## 2019-07-09 RX ADMIN — GADOBUTROL 10 ML: 604.72 INJECTION INTRAVENOUS at 03:07

## 2019-07-09 NOTE — PATIENT INSTRUCTIONS
I have personally reviewed the CT Head from 12/20/2018 with the pt which shows postoperative changes from right frontotemporal craniotomy for  tuberculum/diaphragma sella meningioma resection.     Headache and memory deficit: Will refer to Neurology for further management.    I have ordered a MRI brain and will contact the pt with the results.
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
## 2019-07-10 NOTE — PROGRESS NOTES
I have reviewed the MRI Brain W WO Contrast (07/09/2019) which shows a recurrence in the suprasellar region. I will refer her to Dr. Channing Barnhart for possible radiation treatment.

## 2019-07-12 ENCOUNTER — TELEPHONE (OUTPATIENT)
Dept: NEUROSURGERY | Facility: CLINIC | Age: 34
End: 2019-07-12

## 2019-07-12 NOTE — TELEPHONE ENCOUNTER
I returned the pt call and informed her that  will contact her on next week with the results from her  Brain MRI. The pt verbalized understanding and will wait to hear from him.  ----- Message from Jean Pierre Winkler sent at 7/12/2019  3:05 PM CDT -----  Contact: Patient   Patient would like to know the results of her MRI    [phone]    Thank you

## 2019-07-18 ENCOUNTER — TELEPHONE (OUTPATIENT)
Dept: NEUROSURGERY | Facility: CLINIC | Age: 34
End: 2019-07-18

## 2019-07-18 DIAGNOSIS — D32.9 MENINGIOMA: Primary | ICD-10-CM

## 2019-07-25 ENCOUNTER — INITIAL CONSULT (OUTPATIENT)
Dept: RADIATION ONCOLOGY | Facility: CLINIC | Age: 34
End: 2019-07-25
Payer: MEDICAID

## 2019-07-25 VITALS
WEIGHT: 217.69 LBS | SYSTOLIC BLOOD PRESSURE: 127 MMHG | HEART RATE: 88 BPM | DIASTOLIC BLOOD PRESSURE: 74 MMHG | BODY MASS INDEX: 38.57 KG/M2 | TEMPERATURE: 98 F | OXYGEN SATURATION: 100 % | HEIGHT: 63 IN | RESPIRATION RATE: 18 BRPM

## 2019-07-25 DIAGNOSIS — D32.9 MENINGIOMA: Primary | ICD-10-CM

## 2019-07-25 PROCEDURE — 99999 PR PBB SHADOW E&M-EST. PATIENT-LVL IV: ICD-10-PCS | Mod: PBBFAC,,, | Performed by: RADIOLOGY

## 2019-07-25 PROCEDURE — 99999 PR PBB SHADOW E&M-EST. PATIENT-LVL IV: CPT | Mod: PBBFAC,,, | Performed by: RADIOLOGY

## 2019-07-25 PROCEDURE — 99205 OFFICE O/P NEW HI 60 MIN: CPT | Mod: S$PBB,,, | Performed by: RADIOLOGY

## 2019-07-25 PROCEDURE — 99214 OFFICE O/P EST MOD 30 MIN: CPT | Mod: PBBFAC | Performed by: RADIOLOGY

## 2019-07-25 PROCEDURE — 99205 PR OFFICE/OUTPT VISIT, NEW, LEVL V, 60-74 MIN: ICD-10-PCS | Mod: S$PBB,,, | Performed by: RADIOLOGY

## 2019-07-25 NOTE — LETTER
July 25, 2019      Oscar Linda MD  [address]  [place] [address]           [facility]  [address]  [facility] [address]  Phone: [phone]          Patient: Radha Moses   MR Number: [id number]   YOB: 1985   Date of Visit: 7/25/2019       Dear Dr. Oscar Linda:    Thank you for referring Radha Moses to me for evaluation. Attached you will find relevant portions of my assessment and plan of care.    If you have questions, please do not hesitate to call me. I look forward to following Radha Moses along with you.    Sincerely,    Channing Barnhart MD    Enclosure  CC:  No Recipients    If you would like to receive this communication electronically, please contact [email or web address] or [phone] to request more information on [employer] Link access.    For providers and/or their staff who would like to refer a patient to [facility], please contact us through our one-stop-shop provider referral line, [facility] , at [phone].    If you feel you have received this communication in error or would no longer like to receive these types of communications, please e-mail [email or web address]
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
## 2019-08-20 ENCOUNTER — TELEPHONE (OUTPATIENT)
Dept: INFUSION THERAPY | Facility: HOSPITAL | Age: 34
End: 2019-08-20

## 2019-08-27 ENCOUNTER — OFFICE VISIT (OUTPATIENT)
Dept: PSYCHIATRY | Facility: CLINIC | Age: 34
End: 2019-08-27
Payer: MEDICAID

## 2019-08-27 DIAGNOSIS — F43.23 ADJUSTMENT DISORDER WITH MIXED ANXIETY AND DEPRESSED MOOD: Primary | ICD-10-CM

## 2019-08-27 PROCEDURE — 99999 PR PBB SHADOW E&M-EST. PATIENT-LVL II: ICD-10-PCS | Mod: PBBFAC,,, | Performed by: PSYCHOLOGIST

## 2019-08-27 PROCEDURE — 90791 PR PSYCHIATRIC DIAGNOSTIC EVALUATION: ICD-10-PCS | Mod: GT,,, | Performed by: PSYCHOLOGIST

## 2019-08-27 PROCEDURE — 99999 PR PBB SHADOW E&M-EST. PATIENT-LVL II: CPT | Mod: PBBFAC,,, | Performed by: PSYCHOLOGIST

## 2019-08-27 PROCEDURE — 99212 OFFICE O/P EST SF 10 MIN: CPT | Mod: PBBFAC | Performed by: PSYCHOLOGIST

## 2019-08-27 PROCEDURE — 90791 PSYCH DIAGNOSTIC EVALUATION: CPT | Mod: GT,,, | Performed by: PSYCHOLOGIST
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
## 2019-08-27 NOTE — PSYCH TESTING
1.  Little interest or pleasure in doing things: Several days                = 1   2.  Feeling down, depressed or hopeless: More than half of days  = 2   3.  Trouble falling or staying asleep, or sleeping too much: Nearly every day           = 3   4.  Feeling tired or having little energy: Several days                = 1   5.  Poor appetite or overeating: Several days                = 1   6.  Feeling bad about yourself - or that you are a failure or have let yourself or your family down: More than half of days  = 2   7.  Trouble concentrating on things, such as reading the newspaper or watching television: Not at all                       = 0   8.  Moving or speaking so slowly that other people could have noticed.  Or the opposite - being fidgety or restless that you have been moving around a lot more than usual: More than half of days  = 2   9.  Thoughts that you would be better off dead, or of hurting yourself: Not at all                       = 0    PHQ-9 Total:  12, moderate     The patient completed the General Anxiety Disorder, 7 Items (GAD7)  and received a score of 19, indicating severe anxiety symptoms presently   impacting current functioning.

## 2023-03-17 ENCOUNTER — TELEPHONE (OUTPATIENT)
Dept: NEUROSURGERY | Facility: CLINIC | Age: 38
End: 2023-03-17
Payer: MEDICAID

## 2023-03-17 NOTE — TELEPHONE ENCOUNTER
"Called pt. Her Neurologist is concerned about "swelling on her brain.    Advised pt to obtain recent MRI on disc and mail it to us. Offered VV or in person.    Advised pt to go to ED if new or worsening symptoms. She v/u & thx  "

## 2023-03-21 ENCOUNTER — TELEPHONE (OUTPATIENT)
Dept: NEUROSURGERY | Facility: CLINIC | Age: 38
End: 2023-03-21
Payer: MEDICAID

## 2023-03-21 NOTE — TELEPHONE ENCOUNTER
S/W pt. Her Dr's office is mailing disc to us. She will also get one and bring it to appt.    Will await getting disc and schedule.    ----- Message from Annabelle Harvey sent at 3/21/2023  2:25 PM CDT -----  Contact: Radha @[phone]  Pt returning phone call regarding an appt

## 2023-03-27 ENCOUNTER — TELEPHONE (OUTPATIENT)
Dept: NEUROSURGERY | Facility: CLINIC | Age: 38
End: 2023-03-27
Payer: MEDICAID

## 2023-04-04 ENCOUNTER — OFFICE VISIT (OUTPATIENT)
Dept: NEUROSURGERY | Facility: CLINIC | Age: 38
End: 2023-04-04
Payer: MEDICAID

## 2023-04-04 VITALS
HEART RATE: 96 BPM | BODY MASS INDEX: 45.75 KG/M2 | HEIGHT: 63 IN | SYSTOLIC BLOOD PRESSURE: 144 MMHG | DIASTOLIC BLOOD PRESSURE: 83 MMHG | WEIGHT: 258.19 LBS

## 2023-04-04 DIAGNOSIS — D32.9 MENINGIOMA: Primary | ICD-10-CM

## 2023-04-04 DIAGNOSIS — G93.6 CEREBRAL EDEMA: ICD-10-CM

## 2023-04-04 PROCEDURE — 99214 PR OFFICE/OUTPT VISIT, EST, LEVL IV, 30-39 MIN: ICD-10-PCS | Mod: S$PBB,,, | Performed by: NEUROLOGICAL SURGERY

## 2023-04-04 PROCEDURE — 3079F PR MOST RECENT DIASTOLIC BLOOD PRESSURE 80-89 MM HG: ICD-10-PCS | Mod: CPTII,,, | Performed by: NEUROLOGICAL SURGERY

## 2023-04-04 PROCEDURE — 99214 OFFICE O/P EST MOD 30 MIN: CPT | Mod: S$PBB,,, | Performed by: NEUROLOGICAL SURGERY

## 2023-04-04 PROCEDURE — 1160F PR REVIEW ALL MEDS BY PRESCRIBER/CLIN PHARMACIST DOCUMENTED: ICD-10-PCS | Mod: CPTII,,, | Performed by: NEUROLOGICAL SURGERY

## 2023-04-04 PROCEDURE — 1160F RVW MEDS BY RX/DR IN RCRD: CPT | Mod: CPTII,,, | Performed by: NEUROLOGICAL SURGERY

## 2023-04-04 PROCEDURE — 99999 PR PBB SHADOW E&M-EST. PATIENT-LVL III: CPT | Mod: PBBFAC,,, | Performed by: NEUROLOGICAL SURGERY

## 2023-04-04 PROCEDURE — 3008F BODY MASS INDEX DOCD: CPT | Mod: CPTII,,, | Performed by: NEUROLOGICAL SURGERY

## 2023-04-04 PROCEDURE — 1159F PR MEDICATION LIST DOCUMENTED IN MEDICAL RECORD: ICD-10-PCS | Mod: CPTII,,, | Performed by: NEUROLOGICAL SURGERY

## 2023-04-04 PROCEDURE — 1159F MED LIST DOCD IN RCRD: CPT | Mod: CPTII,,, | Performed by: NEUROLOGICAL SURGERY

## 2023-04-04 PROCEDURE — 99213 OFFICE O/P EST LOW 20 MIN: CPT | Mod: PBBFAC | Performed by: NEUROLOGICAL SURGERY

## 2023-04-04 PROCEDURE — 3077F PR MOST RECENT SYSTOLIC BLOOD PRESSURE >= 140 MM HG: ICD-10-PCS | Mod: CPTII,,, | Performed by: NEUROLOGICAL SURGERY

## 2023-04-04 PROCEDURE — 3077F SYST BP >= 140 MM HG: CPT | Mod: CPTII,,, | Performed by: NEUROLOGICAL SURGERY

## 2023-04-04 PROCEDURE — 99999 PR PBB SHADOW E&M-EST. PATIENT-LVL III: ICD-10-PCS | Mod: PBBFAC,,, | Performed by: NEUROLOGICAL SURGERY

## 2023-04-04 PROCEDURE — 3079F DIAST BP 80-89 MM HG: CPT | Mod: CPTII,,, | Performed by: NEUROLOGICAL SURGERY

## 2023-04-04 PROCEDURE — 3008F PR BODY MASS INDEX (BMI) DOCUMENTED: ICD-10-PCS | Mod: CPTII,,, | Performed by: NEUROLOGICAL SURGERY

## 2023-04-04 RX ORDER — POTASSIUM CHLORIDE 750 MG/1
10 TABLET, EXTENDED RELEASE ORAL
COMMUNITY
Start: 2023-03-08

## 2023-04-04 RX ORDER — HYDROCHLOROTHIAZIDE 12.5 MG/1
12.5 CAPSULE ORAL
COMMUNITY
Start: 2023-03-08

## 2023-04-04 RX ORDER — BUDESONIDE AND FORMOTEROL FUMARATE DIHYDRATE 80; 4.5 UG/1; UG/1
AEROSOL RESPIRATORY (INHALATION)
COMMUNITY
Start: 2023-03-23

## 2023-04-04 RX ORDER — HYDROXYZINE HYDROCHLORIDE 50 MG/1
50 TABLET, FILM COATED ORAL NIGHTLY
COMMUNITY
Start: 2023-03-08

## 2023-04-04 NOTE — PATIENT INSTRUCTIONS
I have personally reviewed the MRI previous with the pt which shows postoperative changes from right frontotemporal craniotomy for  tuberculum/diaphragma sella meningioma resection.     I will follow up with the patient after I receive the HVF report.
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
## 2023-04-05 ENCOUNTER — TELEPHONE (OUTPATIENT)
Dept: NEUROSURGERY | Facility: CLINIC | Age: 38
End: 2023-04-05
Payer: MEDICAID

## 2023-04-05 NOTE — TELEPHONE ENCOUNTER
----- Message from Lexi Alcaraz sent at 4/5/2023 10:26 AM CDT -----  Regarding: Call back  Contact: @[phone]  Patient is requesting to speak to Jocelynn, to see if she received fax from Dr. Sandy Mcbride. @[phone]

## 2023-04-17 ENCOUNTER — TELEPHONE (OUTPATIENT)
Dept: NEUROSURGERY | Facility: CLINIC | Age: 38
End: 2023-04-17
Payer: MEDICAID

## 2023-04-17 NOTE — TELEPHONE ENCOUNTER
----- Message from Magnolia Santana sent at 4/17/2023 11:17 AM CDT -----  Regarding: Results  Contact: pt [phone]  Pt is calling to request test results from eye exam, please call

## 2023-04-17 NOTE — TELEPHONE ENCOUNTER
----- Message from Magnolia Santana sent at 4/17/2023 11:17 AM CDT -----  Regarding: Results  Contact: pt [phone]  Pt is calling to request test results from eye exam, please call

## 2023-04-17 NOTE — TELEPHONE ENCOUNTER
Called pt back. Gave her same info left on her V/M  ----- Message from Catherine Quan sent at 4/17/2023  4:06 PM CDT -----  Regarding: eye test results  The patient called requesting to speak to Nurse Cabezas - regarding her eye testing results  Please return call at your earliest opportunity    No further information provided      Patient can be contacted @# [phone] (home)

## 2023-04-21 ENCOUNTER — PATIENT MESSAGE (OUTPATIENT)
Dept: ENDOCRINOLOGY | Facility: CLINIC | Age: 38
End: 2023-04-21
Payer: MEDICAID

## 2023-05-03 ENCOUNTER — OFFICE VISIT (OUTPATIENT)
Dept: NEUROSURGERY | Facility: CLINIC | Age: 38
End: 2023-05-03
Payer: MEDICAID

## 2023-05-03 DIAGNOSIS — D32.9 MENINGIOMA: Primary | ICD-10-CM

## 2023-05-03 DIAGNOSIS — G93.6 CEREBRAL EDEMA: ICD-10-CM

## 2023-05-03 PROCEDURE — 1160F RVW MEDS BY RX/DR IN RCRD: CPT | Mod: CPTII,95,, | Performed by: NEUROLOGICAL SURGERY

## 2023-05-03 PROCEDURE — 99214 OFFICE O/P EST MOD 30 MIN: CPT | Mod: 95,,, | Performed by: NEUROLOGICAL SURGERY

## 2023-05-03 PROCEDURE — 99214 PR OFFICE/OUTPT VISIT, EST, LEVL IV, 30-39 MIN: ICD-10-PCS | Mod: 95,,, | Performed by: NEUROLOGICAL SURGERY

## 2023-05-03 PROCEDURE — 1159F MED LIST DOCD IN RCRD: CPT | Mod: CPTII,95,, | Performed by: NEUROLOGICAL SURGERY

## 2023-05-03 PROCEDURE — 1160F PR REVIEW ALL MEDS BY PRESCRIBER/CLIN PHARMACIST DOCUMENTED: ICD-10-PCS | Mod: CPTII,95,, | Performed by: NEUROLOGICAL SURGERY

## 2023-05-03 PROCEDURE — 1159F PR MEDICATION LIST DOCUMENTED IN MEDICAL RECORD: ICD-10-PCS | Mod: CPTII,95,, | Performed by: NEUROLOGICAL SURGERY

## 2023-05-03 NOTE — PATIENT INSTRUCTIONS
I have personally reviewed the MRI brain with the pt which shows stable sellar/suprasellar mass suggestive of meningioma. There is compression of the optic nerve.    I have discussed the following treatment options with the patient:  Observation  Craniotomy for tumor resection    Given that the vision in the left eye remained stable, I recommend we continue following in the interim.    Pt will follow up with her ophthalmologist in 3 months. We will f/u thereafter.    Will route to Dr Reyna to place orders for pts upcoming appointment.  Bronwyn Mccollum, CMA
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
## 2023-06-12 ENCOUNTER — PATIENT MESSAGE (OUTPATIENT)
Dept: NEUROSURGERY | Facility: CLINIC | Age: 38
End: 2023-06-12
Payer: MEDICAID

## 2023-06-14 NOTE — INTERVAL H&P NOTE
The patient has been examined and the H&P has been reviewed:    I concur with the findings and no changes have occurred since H&P was written.    Anesthesia/Surgery risks, benefits and alternative options discussed and understood by patient/family.          Active Hospital Problems    Diagnosis  POA    Mass [R22.9]  Yes      Resolved Hospital Problems   No resolved problems to display.      [Fatigue] : fatigue [Recent Weight Gain (___ Lbs)] : recent weight gain: [unfilled] lbs [Heartburn] : heartburn [Joint Pain] : joint pain [Back Pain] : back pain [Dry Skin] : dry skin [Headaches] : headaches [Depression] : depression [Anxiety] : anxiety [Negative] : Heme/Lymph [FreeTextEntry8] : last period 40 yo , has had irregular periods before

## 2023-08-30 ENCOUNTER — PATIENT MESSAGE (OUTPATIENT)
Dept: NEUROSURGERY | Facility: CLINIC | Age: 38
End: 2023-08-30
Payer: MEDICAID

## 2023-10-03 ENCOUNTER — OFFICE VISIT (OUTPATIENT)
Dept: NEUROSURGERY | Facility: CLINIC | Age: 38
End: 2023-10-03
Payer: MEDICAID

## 2023-10-03 DIAGNOSIS — D32.9 MENINGIOMA: Primary | ICD-10-CM

## 2023-10-03 PROCEDURE — 1159F PR MEDICATION LIST DOCUMENTED IN MEDICAL RECORD: ICD-10-PCS | Mod: CPTII,95,, | Performed by: NEUROLOGICAL SURGERY

## 2023-10-03 PROCEDURE — 99214 PR OFFICE/OUTPT VISIT, EST, LEVL IV, 30-39 MIN: ICD-10-PCS | Mod: 95,,, | Performed by: NEUROLOGICAL SURGERY

## 2023-10-03 PROCEDURE — 1160F RVW MEDS BY RX/DR IN RCRD: CPT | Mod: CPTII,95,, | Performed by: NEUROLOGICAL SURGERY

## 2023-10-03 PROCEDURE — 1159F MED LIST DOCD IN RCRD: CPT | Mod: CPTII,95,, | Performed by: NEUROLOGICAL SURGERY

## 2023-10-03 PROCEDURE — 1160F PR REVIEW ALL MEDS BY PRESCRIBER/CLIN PHARMACIST DOCUMENTED: ICD-10-PCS | Mod: CPTII,95,, | Performed by: NEUROLOGICAL SURGERY

## 2023-10-03 PROCEDURE — 99214 OFFICE O/P EST MOD 30 MIN: CPT | Mod: 95,,, | Performed by: NEUROLOGICAL SURGERY

## 2023-10-03 NOTE — PROGRESS NOTES
The patient location is: [place]  The chief complaint leading to consultation is: follow up    Visit type: audiovisual    Face to Face time with patient: 15 minutes  30 minutes of total time spent on the encounter, which includes face to face time and non-face to face time preparing to see the patient (eg, review of tests), Obtaining and/or reviewing separately obtained history, Documenting clinical information in the electronic or other health record, Independently interpreting results (not separately reported) and communicating results to the patient/family/caregiver, or Care coordination (not separately reported).         Each patient to whom he or she provides medical services by telemedicine is:  (1) informed of the relationship between the physician and patient and the respective role of any other health care provider with respect to management of the patient; and (2) notified that he or she may decline to receive medical services by telemedicine and may withdraw from such care at any time.    Notes:    Subjective:   IConine, attest that this documentation has been prepared under the direction and in the presence of Oscar Linda MD.     Patient ID: Radha Moses is a 38 y.o. female     Chief Complaint: No chief complaint on file.      HPI  Ms. Radha Moses is a 38 y.o. 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 At the time of our last clinic visit on 5/3/2023, the pt is here to discuss her recent ophthalmology visit on 4/5/2023. Per note, the pt c/o blurred vision to the R eye. HVF noted for total vision loss in the right eye. No light detection noted.    Today the pt reports doing well. No change since our last visit. Most recent HVF is stable-- no change from  prior exam. Total vision loss in the R eye, some diminished vision loss in the L.    Review of Systems   Constitutional:  Negative for activity change, appetite change, fatigue, fever and unexpected weight change.   HENT:  Negative for facial swelling.    Eyes:  Positive for visual disturbance (unchanged from prior).   Respiratory: Negative.     Cardiovascular: Negative.    Gastrointestinal:  Negative for diarrhea, nausea and vomiting.   Endocrine: Negative.    Genitourinary: Negative.    Musculoskeletal:  Negative for back pain, joint swelling, myalgias and neck pain.   Neurological:  Negative for dizziness, seizures, weakness, numbness and headaches.   Psychiatric/Behavioral: Negative.          Past Medical History:   Diagnosis Date    Asthma     Brain tumor     Hypertension        Objective:    There were no vitals filed for this visit.   Physical Exam  Constitutional:       General: She is not in acute distress.     Appearance: Normal appearance.   HENT:      Head: Normocephalic and atraumatic.   Neurological:      Mental Status: She is alert and oriented to person, place, and time.            I, Dr. Oscar Linda, personally performed the services described in this documentation. All medical record entries made by the scribe, Connie Velásquez, were at my direction and in my presence.  I have reviewed the chart and agree that the record reflects my personal performance and is accurate and complete. Oscar Linda MD. 10/03/2023    Assessment:       Meningioma.     Plan:   I have discussed the following treatment options with the patient:  Observation  Radiation    I recommend we continue following the tumor. I will schedule the patient for 1 year follow up with MRI brain. If the pt's vision worsens, I recommend she consult her ophthalmologist for HVF and follow up with me sooner.    Pt advised to contact us with any questions, concerns, or if she experiences any new or worsening symptoms.

## 2023-10-03 NOTE — PATIENT INSTRUCTIONS
I have discussed the following treatment options with the patient:  Observation  Radiation    I recommend we continue following the tumor. I will schedule the patient for 1 year follow up with MRI brain. If the pt's vision worsens, I recommend she consult her ophthalmologist for HVF and follow up with me sooner.    Pt advised to contact us with any questions, concerns, or if she experiences any new or worsening symptoms.

## 2024-01-19 ENCOUNTER — TELEPHONE (OUTPATIENT)
Dept: NEUROSURGERY | Facility: CLINIC | Age: 39
End: 2024-01-19
Payer: MEDICAID

## 2024-06-11 ENCOUNTER — PATIENT MESSAGE (OUTPATIENT)
Dept: NEUROSURGERY | Facility: CLINIC | Age: 39
End: 2024-06-11
Payer: MEDICAID

## (undated) DEVICE — KIT EVACUATOR 3-SPRING 1/8 DRN

## (undated) DEVICE — SEE MEDLINE ITEM 146292

## (undated) DEVICE — COTTON BALLS 1/2IN

## (undated) DEVICE — DRESSING ADH FILM TELFA 2X3IN

## (undated) DEVICE — TRAY FOLEY 16FR INFECTION CONT

## (undated) DEVICE — HEMOSTAT SURGICEL 4X8IN

## (undated) DEVICE — SEE MEDLINE ITEM 156905

## (undated) DEVICE — RUBBERBAND STERILE 3X1/8IN

## (undated) DEVICE — DRESSING SURGICAL 1X3

## (undated) DEVICE — ELECTRODE REM PLYHSV RETURN 9

## (undated) DEVICE — DURAPREP SURG SCRUB 26ML

## (undated) DEVICE — SEE MEDLINE ITEM 152622

## (undated) DEVICE — DRAPE THYROID WITH ARMBOARD

## (undated) DEVICE — CARTRIDGE OIL

## (undated) DEVICE — SUT CTD VICRYL BR CR/SH VIL

## (undated) DEVICE — TIP SONAPET BARACUDA

## (undated) DEVICE — WARMER DRAPE STERILE LF

## (undated) DEVICE — DRAPE SURGICAL STERI IRRG PCH

## (undated) DEVICE — SET SONAPET TUBING W/EXT

## (undated) DEVICE — BURR ACORN 7.5MM

## (undated) DEVICE — BLADE SURG CARBON STEEL SZ11

## (undated) DEVICE — ROUTER TAPERED 2.3MM

## (undated) DEVICE — CONTAINER SPECIMEN STRL 4OZ

## (undated) DEVICE — HOOK LONE STAR BLUNT 12MM

## (undated) DEVICE — SPONGE NEURO 1/4X1/4

## (undated) DEVICE — SUT VICRYL+ 2-0 SH 18IN

## (undated) DEVICE — MARKER SKIN STND TIP BLUE BARR

## (undated) DEVICE — TUBE FRAZIER 5MM 2FT SOFT TIP

## (undated) DEVICE — DRESSING SURGICAL 1X1

## (undated) DEVICE — DRAPE OPMI STERILE

## (undated) DEVICE — SUT 4/0 18IN NUROLON BLK B

## (undated) DEVICE — CORD BIPOLAR 12 FOOT

## (undated) DEVICE — BUR BONE CUT MICRO TPS 3X3.8MM

## (undated) DEVICE — PINS SKULL ADULT MAYFIELD
Type: IMPLANTABLE DEVICE | Site: CRANIAL | Status: NON-FUNCTIONAL
Removed: 2018-12-20

## (undated) DEVICE — DIFFUSER

## (undated) DEVICE — TAPE SURG MEDIPORE 6X72IN

## (undated) DEVICE — SPRAY MASTISOL

## (undated) DEVICE — SEE MEDLINE ITEM 154981

## (undated) DEVICE — MARKERS SPHERZ PASSIVE

## (undated) DEVICE — DRESSING SURGICAL 1/2X1/2

## (undated) DEVICE — KIT FIBRIN SEALANT EVICEL 5 ML